# Patient Record
Sex: FEMALE | Race: WHITE | Employment: OTHER | URBAN - METROPOLITAN AREA
[De-identification: names, ages, dates, MRNs, and addresses within clinical notes are randomized per-mention and may not be internally consistent; named-entity substitution may affect disease eponyms.]

---

## 2019-05-17 ENCOUNTER — HOSPITAL ENCOUNTER (INPATIENT)
Age: 44
LOS: 2 days | Discharge: HOME | DRG: 640 | End: 2019-05-20
Attending: EMERGENCY MEDICINE | Admitting: INTERNAL MEDICINE
Payer: MEDICARE

## 2019-05-17 ENCOUNTER — APPOINTMENT (OUTPATIENT)
Dept: ULTRASOUND IMAGING | Age: 44
DRG: 640 | End: 2019-05-17
Payer: MEDICARE

## 2019-05-17 DIAGNOSIS — N30.00 ACUTE CYSTITIS WITHOUT HEMATURIA: Primary | ICD-10-CM

## 2019-05-17 DIAGNOSIS — E87.1 HYPONATREMIA: ICD-10-CM

## 2019-05-17 DIAGNOSIS — E87.6 HYPOKALEMIA: ICD-10-CM

## 2019-05-17 DIAGNOSIS — N18.9 CHRONIC RENAL IMPAIRMENT, UNSPECIFIED CKD STAGE: ICD-10-CM

## 2019-05-17 DIAGNOSIS — Q61.3 POLYCYSTIC KIDNEY DISEASE: ICD-10-CM

## 2019-05-17 LAB
A/G RATIO: 1.2 (ref 1.1–2.2)
ALBUMIN SERPL-MCNC: 4.6 G/DL (ref 3.4–5)
ALP BLD-CCNC: 80 U/L (ref 40–129)
ALT SERPL-CCNC: 7 U/L (ref 10–40)
AMYLASE: 133 U/L (ref 25–115)
ANION GAP SERPL CALCULATED.3IONS-SCNC: 21 MMOL/L (ref 3–16)
AST SERPL-CCNC: 12 U/L (ref 15–37)
BACTERIA: ABNORMAL /HPF
BASOPHILS ABSOLUTE: 0.1 K/UL (ref 0–0.2)
BASOPHILS RELATIVE PERCENT: 0.5 %
BILIRUB SERPL-MCNC: 0.4 MG/DL (ref 0–1)
BILIRUBIN URINE: NEGATIVE
BLOOD, URINE: ABNORMAL
BUN BLDV-MCNC: 84 MG/DL (ref 7–20)
CALCIUM SERPL-MCNC: 9.7 MG/DL (ref 8.3–10.6)
CHLORIDE BLD-SCNC: 90 MMOL/L (ref 99–110)
CLARITY: ABNORMAL
CO2: 17 MMOL/L (ref 21–32)
COLOR: YELLOW
CREAT SERPL-MCNC: 9.5 MG/DL (ref 0.6–1.1)
EOSINOPHILS ABSOLUTE: 0.1 K/UL (ref 0–0.6)
EOSINOPHILS RELATIVE PERCENT: 0.6 %
EPITHELIAL CELLS, UA: 5 /HPF (ref 0–5)
GFR AFRICAN AMERICAN: 5
GFR NON-AFRICAN AMERICAN: 4
GLOBULIN: 3.7 G/DL
GLUCOSE BLD-MCNC: 96 MG/DL (ref 70–99)
GLUCOSE URINE: NEGATIVE MG/DL
HCG QUALITATIVE: NEGATIVE
HCT VFR BLD CALC: 35.9 % (ref 36–48)
HEMOGLOBIN: 12 G/DL (ref 12–16)
HYALINE CASTS: 3 /LPF (ref 0–8)
KETONES, URINE: NEGATIVE MG/DL
LEUKOCYTE ESTERASE, URINE: ABNORMAL
LIPASE: 78 U/L (ref 13–60)
LYMPHOCYTES ABSOLUTE: 1.5 K/UL (ref 1–5.1)
LYMPHOCYTES RELATIVE PERCENT: 11.9 %
MAGNESIUM: 2.4 MG/DL (ref 1.8–2.4)
MCH RBC QN AUTO: 29.3 PG (ref 26–34)
MCHC RBC AUTO-ENTMCNC: 33.5 G/DL (ref 31–36)
MCV RBC AUTO: 87.4 FL (ref 80–100)
MICROSCOPIC EXAMINATION: YES
MONOCYTES ABSOLUTE: 0.8 K/UL (ref 0–1.3)
MONOCYTES RELATIVE PERCENT: 6.7 %
NEUTROPHILS ABSOLUTE: 9.9 K/UL (ref 1.7–7.7)
NEUTROPHILS RELATIVE PERCENT: 80.3 %
NITRITE, URINE: POSITIVE
PDW BLD-RTO: 12.6 % (ref 12.4–15.4)
PH UA: 6.5 (ref 5–8)
PLATELET # BLD: 274 K/UL (ref 135–450)
PMV BLD AUTO: 11.1 FL (ref 5–10.5)
POTASSIUM SERPL-SCNC: 2.6 MMOL/L (ref 3.5–5.1)
PROTEIN UA: 100 MG/DL
RBC # BLD: 4.11 M/UL (ref 4–5.2)
RBC UA: 7 /HPF (ref 0–4)
SODIUM BLD-SCNC: 128 MMOL/L (ref 136–145)
SPECIFIC GRAVITY UA: 1.01 (ref 1–1.03)
TOTAL PROTEIN: 8.3 G/DL (ref 6.4–8.2)
URINE REFLEX TO CULTURE: YES
URINE TYPE: ABNORMAL
UROBILINOGEN, URINE: 0.2 E.U./DL
WBC # BLD: 12.3 K/UL (ref 4–11)
WBC UA: 186 /HPF (ref 0–5)

## 2019-05-17 PROCEDURE — 82150 ASSAY OF AMYLASE: CPT

## 2019-05-17 PROCEDURE — 93005 ELECTROCARDIOGRAM TRACING: CPT | Performed by: PHYSICIAN ASSISTANT

## 2019-05-17 PROCEDURE — 99285 EMERGENCY DEPT VISIT HI MDM: CPT

## 2019-05-17 PROCEDURE — 76770 US EXAM ABDO BACK WALL COMP: CPT

## 2019-05-17 PROCEDURE — 81001 URINALYSIS AUTO W/SCOPE: CPT

## 2019-05-17 PROCEDURE — 80053 COMPREHEN METABOLIC PANEL: CPT

## 2019-05-17 PROCEDURE — 85025 COMPLETE CBC W/AUTO DIFF WBC: CPT

## 2019-05-17 PROCEDURE — 6360000002 HC RX W HCPCS: Performed by: PHYSICIAN ASSISTANT

## 2019-05-17 PROCEDURE — 87086 URINE CULTURE/COLONY COUNT: CPT

## 2019-05-17 PROCEDURE — 96375 TX/PRO/DX INJ NEW DRUG ADDON: CPT

## 2019-05-17 PROCEDURE — 96361 HYDRATE IV INFUSION ADD-ON: CPT

## 2019-05-17 PROCEDURE — 84703 CHORIONIC GONADOTROPIN ASSAY: CPT

## 2019-05-17 PROCEDURE — 83735 ASSAY OF MAGNESIUM: CPT

## 2019-05-17 PROCEDURE — 2580000003 HC RX 258: Performed by: PHYSICIAN ASSISTANT

## 2019-05-17 PROCEDURE — 83690 ASSAY OF LIPASE: CPT

## 2019-05-17 PROCEDURE — 96365 THER/PROPH/DIAG IV INF INIT: CPT

## 2019-05-17 RX ORDER — 0.9 % SODIUM CHLORIDE 0.9 %
1000 INTRAVENOUS SOLUTION INTRAVENOUS ONCE
Status: COMPLETED | OUTPATIENT
Start: 2019-05-17 | End: 2019-05-18

## 2019-05-17 RX ORDER — POTASSIUM CHLORIDE 7.45 MG/ML
10 INJECTION INTRAVENOUS ONCE
Status: COMPLETED | OUTPATIENT
Start: 2019-05-17 | End: 2019-05-17

## 2019-05-17 RX ORDER — ONDANSETRON 2 MG/ML
4 INJECTION INTRAMUSCULAR; INTRAVENOUS ONCE
Status: COMPLETED | OUTPATIENT
Start: 2019-05-17 | End: 2019-05-17

## 2019-05-17 RX ADMIN — SODIUM CHLORIDE 1000 ML: 9 INJECTION, SOLUTION INTRAVENOUS at 21:39

## 2019-05-17 RX ADMIN — Medication 10 MEQ: at 22:35

## 2019-05-17 RX ADMIN — ONDANSETRON 4 MG: 2 INJECTION INTRAMUSCULAR; INTRAVENOUS at 21:39

## 2019-05-17 ASSESSMENT — ENCOUNTER SYMPTOMS
ALLERGIC/IMMUNOLOGIC NEGATIVE: 1
ANAL BLEEDING: 0
BLOOD IN STOOL: 0
COUGH: 0
RECTAL PAIN: 0
WHEEZING: 0
VOMITING: 1
CONSTIPATION: 0
ABDOMINAL DISTENTION: 0
SHORTNESS OF BREATH: 0
NAUSEA: 1
STRIDOR: 0
ABDOMINAL PAIN: 0
DIARRHEA: 0
BACK PAIN: 0
COLOR CHANGE: 0

## 2019-05-17 NOTE — LETTER
Beneficiary Notification Letter     This [facility] Provider is Participating in an Innovative Payment and [address] from Johann Troncoso:   [facility] is participating in a Medicare initiative called the [facility] [address] [facility]. You are receiving this letter because your health care provider has identified you as a patient who is receiving care through this initiative. Health care providers participating in the [facility] [address] [facility], including [facility], will work with Medicare to improve care for patients. Your Medicare rights have not been changed. You still have all the same Medicare rights and protections, including the right to choose which hospital, doctor, or other health care provider you see. However, because [facility] chose to participate in the [address] [place] [address], all Medicare beneficiaries who meet the eligibility criteria of this initiative will receive care under the initiative. If you do not wish to receive care under the Bundled Payments [facility] [address] [facility], you must choose a health care provider that does not participate in this initiative for your care. Regardless of which health care provider you see, Medicare will continue to cover all of your medically necessary services. Bundled Payments for Care Improvement Advanced aims to help improve your care     The Bundled Payments [facility] [address] [facility] is an innovative Medicare initiative that encourages your doctors, hospitals, and other health care providers to work more closely together so you get better care during and following certain hospital stays.  In this initiative, doctors and hospitals may work closely with certain health care providers and suppliers that help patients recover after discharge from the hospital, including skilled nursing facilities, home health agencies, inpatient rehabilitation facilities, and long term care hospitals. [facility] is working closely with the doctors and other health care providers that care for you during and following your hospital stay and for a period of time after you leave the hospital. By working together, the health care providers are trying to more efficiently provide well-managed, high quality, patient-centered care as you undergo treatment. Hospitals, doctors, and other health care providers that care for you following a hospital stay may receive an additional payment for providing better, more coordinated health care. Medicare will monitor your care to make sure you and others get high quality care. Your feedback is important     Medicare may also ask you to answer a survey about the services and care you received from [address] [place] [address] will be mailed to you. Your feedback will improve care for all people with Medicare who receive care from [facility]. Completion of this survey is optional.     Get more information     For more information about the Bundled Payments for [facility], you can:    · Visit the CMS BPCI Advanced Website at http://sahu-allred.net/ initiatives/bpci-advanced   · Call the [facility] team at (737) 572-8168. · Call 1-800-MEDICARE (6-857.277.9675). TTY users can call 3-590.393.1035     If you have concerns or complaints about your care, talk to your health care provider, or contact your Beneficiary and Family Centered Quality Improvement Organization [facility]). To get your [facility]'s phone number, visit Medicare.gov/contacts or call 1-800-MEDICARE. · To find a different hospital, visit www. hospitalcompare.[facility].gov or call 1-800Infor MEDICARE (1-702.108.1040). TTY users should call 0-361.852.1349. · To find a different doctor, visit Medicare's Physician Compare website, HDTapes.co.nz, or call 1-800-MEDICARE (609 3306). TTY users should call 5-400.198.9579. · To find a different skilled nursing facility, visit [facility] Medico website, https://www.AuthorBee.Beddit/, or call 1-800-MEDICARE (1- 112.122.5414). TTY users should call 3-887.394.7732. · To find a different long term care hospital, visit [facility] Compare website, [employer].be, or call 1-800- MEDICARE (429 7569). TTY users should call 1-111.821.8558. · To find a different inpatient rehabilitation facility, visit [facility] Compare website, www.medicare.gov/ inpatientrehabilitation facilitycompare, or call 1-800-MEDICARE (9-167.316.5860). TTY users should call 3- 200.532.5577. · To find a different home health agency, visit [address] website, www.medicare.gov/homehealthcompare, or call 1-800-MEDICARE (2-781- 436-1440). TTY users should call [phone].

## 2019-05-18 PROBLEM — N39.0 UTI (URINARY TRACT INFECTION): Status: ACTIVE | Noted: 2019-05-18

## 2019-05-18 PROBLEM — I10 ESSENTIAL HYPERTENSION: Status: ACTIVE | Noted: 2019-05-18

## 2019-05-18 PROBLEM — N17.9 ACUTE RENAL FAILURE (ARF) (HCC): Status: ACTIVE | Noted: 2019-05-18

## 2019-05-18 PROBLEM — E87.6 HYPOKALEMIA: Status: ACTIVE | Noted: 2019-05-18

## 2019-05-18 LAB
ANION GAP SERPL CALCULATED.3IONS-SCNC: 20 MMOL/L (ref 3–16)
BUN BLDV-MCNC: 80 MG/DL (ref 7–20)
CALCIUM SERPL-MCNC: 9.1 MG/DL (ref 8.3–10.6)
CHLORIDE BLD-SCNC: 94 MMOL/L (ref 99–110)
CO2: 24 MMOL/L (ref 21–32)
CREAT SERPL-MCNC: 9.6 MG/DL (ref 0.6–1.1)
EKG ATRIAL RATE: 60 BPM
EKG DIAGNOSIS: NORMAL
EKG P AXIS: 39 DEGREES
EKG P-R INTERVAL: 170 MS
EKG Q-T INTERVAL: 454 MS
EKG QRS DURATION: 100 MS
EKG QTC CALCULATION (BAZETT): 454 MS
EKG R AXIS: 1 DEGREES
EKG T AXIS: 15 DEGREES
EKG VENTRICULAR RATE: 60 BPM
GFR AFRICAN AMERICAN: 5
GFR NON-AFRICAN AMERICAN: 4
GLUCOSE BLD-MCNC: 102 MG/DL (ref 70–99)
MAGNESIUM: 2.6 MG/DL (ref 1.8–2.4)
POTASSIUM SERPL-SCNC: 3 MMOL/L (ref 3.5–5.1)
SODIUM BLD-SCNC: 138 MMOL/L (ref 136–145)

## 2019-05-18 PROCEDURE — 6360000002 HC RX W HCPCS: Performed by: NURSE PRACTITIONER

## 2019-05-18 PROCEDURE — 2580000003 HC RX 258: Performed by: PHYSICIAN ASSISTANT

## 2019-05-18 PROCEDURE — 2500000003 HC RX 250 WO HCPCS: Performed by: PHYSICIAN ASSISTANT

## 2019-05-18 PROCEDURE — 6370000000 HC RX 637 (ALT 250 FOR IP): Performed by: INTERNAL MEDICINE

## 2019-05-18 PROCEDURE — 96375 TX/PRO/DX INJ NEW DRUG ADDON: CPT

## 2019-05-18 PROCEDURE — 2500000003 HC RX 250 WO HCPCS: Performed by: NURSE PRACTITIONER

## 2019-05-18 PROCEDURE — 93010 ELECTROCARDIOGRAM REPORT: CPT | Performed by: INTERNAL MEDICINE

## 2019-05-18 PROCEDURE — 84300 ASSAY OF URINE SODIUM: CPT

## 2019-05-18 PROCEDURE — 6360000002 HC RX W HCPCS: Performed by: PHYSICIAN ASSISTANT

## 2019-05-18 PROCEDURE — 1200000000 HC SEMI PRIVATE

## 2019-05-18 PROCEDURE — 96361 HYDRATE IV INFUSION ADD-ON: CPT

## 2019-05-18 PROCEDURE — 2580000003 HC RX 258: Performed by: NURSE PRACTITIONER

## 2019-05-18 PROCEDURE — 6370000000 HC RX 637 (ALT 250 FOR IP): Performed by: HOSPITALIST

## 2019-05-18 PROCEDURE — 82436 ASSAY OF URINE CHLORIDE: CPT

## 2019-05-18 PROCEDURE — 83735 ASSAY OF MAGNESIUM: CPT

## 2019-05-18 PROCEDURE — 36415 COLL VENOUS BLD VENIPUNCTURE: CPT

## 2019-05-18 PROCEDURE — 80048 BASIC METABOLIC PNL TOTAL CA: CPT

## 2019-05-18 PROCEDURE — 84133 ASSAY OF URINE POTASSIUM: CPT

## 2019-05-18 RX ORDER — METOPROLOL TARTRATE 50 MG/1
50 TABLET, FILM COATED ORAL 2 TIMES DAILY
Status: DISCONTINUED | OUTPATIENT
Start: 2019-05-18 | End: 2019-05-20 | Stop reason: HOSPADM

## 2019-05-18 RX ORDER — ASPIRIN 81 MG/1
81 TABLET, CHEWABLE ORAL DAILY
Status: DISCONTINUED | OUTPATIENT
Start: 2019-05-18 | End: 2019-05-20 | Stop reason: HOSPADM

## 2019-05-18 RX ORDER — SENNA PLUS 8.6 MG/1
1 TABLET ORAL DAILY PRN
Status: DISCONTINUED | OUTPATIENT
Start: 2019-05-18 | End: 2019-05-20 | Stop reason: HOSPADM

## 2019-05-18 RX ORDER — SODIUM CHLORIDE 0.9 % (FLUSH) 0.9 %
10 SYRINGE (ML) INJECTION PRN
Status: DISCONTINUED | OUTPATIENT
Start: 2019-05-18 | End: 2019-05-20 | Stop reason: HOSPADM

## 2019-05-18 RX ORDER — HEPARIN SODIUM 5000 [USP'U]/ML
5000 INJECTION, SOLUTION INTRAVENOUS; SUBCUTANEOUS 2 TIMES DAILY
Status: DISCONTINUED | OUTPATIENT
Start: 2019-05-18 | End: 2019-05-20 | Stop reason: HOSPADM

## 2019-05-18 RX ORDER — POTASSIUM CHLORIDE 20 MEQ/1
40 TABLET, EXTENDED RELEASE ORAL PRN
Status: DISCONTINUED | OUTPATIENT
Start: 2019-05-18 | End: 2019-05-18

## 2019-05-18 RX ORDER — M-VIT,TX,IRON,MINS/CALC/FOLIC 27MG-0.4MG
1 TABLET ORAL
Status: DISCONTINUED | OUTPATIENT
Start: 2019-05-19 | End: 2019-05-20 | Stop reason: HOSPADM

## 2019-05-18 RX ORDER — DEXTROAMPHETAMINE SACCHARATE, AMPHETAMINE ASPARTATE, DEXTROAMPHETAMINE SULFATE AND AMPHETAMINE SULFATE 2.5; 2.5; 2.5; 2.5 MG/1; MG/1; MG/1; MG/1
20 TABLET ORAL DAILY
Status: DISCONTINUED | OUTPATIENT
Start: 2019-05-18 | End: 2019-05-20 | Stop reason: HOSPADM

## 2019-05-18 RX ORDER — ONDANSETRON 2 MG/ML
4 INJECTION INTRAMUSCULAR; INTRAVENOUS EVERY 6 HOURS PRN
Status: DISCONTINUED | OUTPATIENT
Start: 2019-05-18 | End: 2019-05-20 | Stop reason: HOSPADM

## 2019-05-18 RX ORDER — PANTOPRAZOLE SODIUM 40 MG/1
40 TABLET, DELAYED RELEASE ORAL
Status: DISCONTINUED | OUTPATIENT
Start: 2019-05-19 | End: 2019-05-20 | Stop reason: HOSPADM

## 2019-05-18 RX ORDER — ACETAMINOPHEN 325 MG/1
650 TABLET ORAL EVERY 4 HOURS PRN
Status: DISCONTINUED | OUTPATIENT
Start: 2019-05-18 | End: 2019-05-20 | Stop reason: HOSPADM

## 2019-05-18 RX ORDER — POTASSIUM CHLORIDE 7.45 MG/ML
10 INJECTION INTRAVENOUS PRN
Status: DISCONTINUED | OUTPATIENT
Start: 2019-05-18 | End: 2019-05-18

## 2019-05-18 RX ORDER — SODIUM CHLORIDE 0.9 % (FLUSH) 0.9 %
10 SYRINGE (ML) INJECTION EVERY 12 HOURS SCHEDULED
Status: DISCONTINUED | OUTPATIENT
Start: 2019-05-18 | End: 2019-05-20 | Stop reason: HOSPADM

## 2019-05-18 RX ORDER — ONDANSETRON 4 MG/1
4 TABLET, ORALLY DISINTEGRATING ORAL EVERY 8 HOURS PRN
Status: DISCONTINUED | OUTPATIENT
Start: 2019-05-18 | End: 2019-05-20 | Stop reason: HOSPADM

## 2019-05-18 RX ORDER — POTASSIUM CHLORIDE 20 MEQ/1
20 TABLET, EXTENDED RELEASE ORAL 2 TIMES DAILY WITH MEALS
Status: DISCONTINUED | OUTPATIENT
Start: 2019-05-18 | End: 2019-05-19

## 2019-05-18 RX ADMIN — METOPROLOL TARTRATE 50 MG: 50 TABLET, FILM COATED ORAL at 20:35

## 2019-05-18 RX ADMIN — Medication 1 G: at 01:11

## 2019-05-18 RX ADMIN — POTASSIUM CHLORIDE: 2 INJECTION, SOLUTION, CONCENTRATE INTRAVENOUS at 21:00

## 2019-05-18 RX ADMIN — POTASSIUM CHLORIDE: 2 INJECTION, SOLUTION, CONCENTRATE INTRAVENOUS at 01:11

## 2019-05-18 RX ADMIN — HEPARIN SODIUM 5000 UNITS: 5000 INJECTION INTRAVENOUS; SUBCUTANEOUS at 10:19

## 2019-05-18 RX ADMIN — ASPIRIN 81 MG 81 MG: 81 TABLET ORAL at 12:00

## 2019-05-18 RX ADMIN — HEPARIN SODIUM 5000 UNITS: 5000 INJECTION INTRAVENOUS; SUBCUTANEOUS at 20:35

## 2019-05-18 RX ADMIN — Medication 10 ML: at 10:19

## 2019-05-18 RX ADMIN — POTASSIUM CHLORIDE 20 MEQ: 1500 TABLET, EXTENDED RELEASE ORAL at 16:55

## 2019-05-18 RX ADMIN — DEXTROAMPHETAMINE SACCHARATE, AMPHETAMINE ASPARTATE, DEXTROAMPHETAMINE SULFATE AND AMPHETAMINE SULFATE 20 MG: 2.5; 2.5; 2.5; 2.5 TABLET ORAL at 12:00

## 2019-05-18 NOTE — ED PROVIDER NOTES
[address]        Pt Name: Rad Martínez  MRN: [id number]  [place] 1975  Date of evaluation: 5/17/2019  Provider: Darryle Heaps, PA-C  PCP: Clemente Barnes    This patient was seen and evaluated by the attending physician Dr. Mari Farooq       Chief Complaint   Patient presents with    Emesis     emesis x3 days, denies diarrhea or abd pain        HISTORY OF PRESENT ILLNESS   (Location/Symptom, Timing/Onset, Context/Setting, Quality, Duration, Modifying Factors, Severity)  Note limiting factors. Rad Martínez is a 37 y.o. female with history of CVA, brain aneurysm, polycystic kidney disease who presents to the emergency department with complaints of nausea and vomiting for 3 days. She denies any acute urinary symptoms, hematuria, abnormal vaginal discharge, abdominal pain or distention, headache, dizziness, lightheadedness, diarrhea, constipation, blood or black tarry stool. She is resting comfortably in bed and does not appear to be writhing in pain, pale, diaphoretic or in acute distress. She has history of kidney disease  But is not on dialysis. Nursing Notes were all reviewed and agreed with or any disagreements were addressed  in the HPI. REVIEW OF SYSTEMS    (2-9 systems for level 4, 10 or more for level 5)     Review of Systems   Constitutional: Negative for chills and fever. HENT: Negative. Eyes: Negative for visual disturbance. Respiratory: Negative for cough, shortness of breath, wheezing and stridor. Cardiovascular: Negative for chest pain, palpitations and leg swelling. Gastrointestinal: Positive for nausea and vomiting. Negative for abdominal distention, abdominal pain, anal bleeding, blood in stool, constipation, diarrhea and rectal pain. Endocrine: Negative. Genitourinary: Negative. Musculoskeletal: Negative for back pain, neck pain and neck stiffness.    Skin: Negative for color change, pallor, rash and wound. Allergic/Immunologic: Negative. Neurological: Negative for dizziness, tremors, seizures, syncope, facial asymmetry, speech difficulty, weakness, light-headedness, numbness and headaches. Hematological: Negative. Psychiatric/Behavioral: Negative for confusion. All other systems reviewed and are negative. Positives and Pertinent negatives as per HPI. Except as noted abovein the ROS, all other systems were reviewed and negative. PAST MEDICAL HISTORY     Past Medical History:   Diagnosis Date    Brain aneurysm     Polycystic kidney disease     Unspecified cerebral artery occlusion with cerebral infarction          SURGICAL HISTORY     Past Surgical History:   Procedure Laterality Date    EYE SURGERY           CURRENTMEDICATIONS       Previous Medications    AMPHETAMINE-DEXTROAMPHETAMINE (ADDERALL, 20MG,) 20 MG TABLET    Take 20 mg by mouth daily. ASPIRIN 325 MG TABLET    Take 325 mg by mouth daily. METOPROLOL (LOPRESSOR) 100 MG TABLET    Take 75 mg by mouth 2 times daily. MULTIPLE VITAMINS-MINERALS (THERAPEUTIC MULTIVITAMIN-MINERALS) TABLET    Take 1 tablet by mouth daily.     OMEPRAZOLE (PRILOSEC) 40 MG CAPSULE    Take 1 capsule by mouth daily    ONDANSETRON (ZOFRAN ODT) 4 MG DISINTEGRATING TABLET    Take 1 tablet by mouth every 8 hours as needed for Nausea         ALLERGIES     Contrast [iodides]    FAMILYHISTORY       Family History   Problem Relation Age of Onset    Kidney Disease Mother         PKD    Kidney Disease Sister         PKD          SOCIAL HISTORY       Social History     Socioeconomic History    Marital status: Single     Spouse name: None    Number of children: None    Years of education: None    Highest education level: None   Occupational History    None   Social Needs    Financial resource strain: None    Food insecurity:     Worry: None     Inability: None    Transportation needs:     Medical: None Non-medical: None   Tobacco Use    Smoking status: Current Some Day Smoker     Packs/day: 0.05     Types: Cigarettes    Smokeless tobacco: Never Used    Tobacco comment: 1-2 cigarettes per day   Substance and Sexual Activity    Alcohol use: No     Comment: rarely    Drug use: Yes     Types: Marijuana     Comment: occ    Sexual activity: Not Currently   Lifestyle    Physical activity:     Days per week: None     Minutes per session: None    Stress: None   Relationships    Social connections:     Talks on phone: None     Gets together: None     Attends Yarsanism service: None     Active member of club or organization: None     Attends meetings of clubs or organizations: None     Relationship status: None    Intimate partner violence:     Fear of current or ex partner: None     Emotionally abused: None     Physically abused: None     Forced sexual activity: None   Other Topics Concern    None   Social History Narrative    None       SCREENINGS             PHYSICAL EXAM    (up to 7 for level 4, 8 or more for level 5)     ED Triage Vitals [05/17/19 2107]   BP Temp Temp src Pulse Resp SpO2 Height Weight   (!) 106/51 97.9 °F (36.6 °C) -- 73 16 100 % 5' 4\" (1.626 m) 127 lb 6.4 oz (57.8 kg)       Physical Exam   Constitutional: She is oriented to person, place, and time. She appears well-developed and well-nourished. No distress. HENT:   Head: Normocephalic and atraumatic. Right Ear: External ear normal.   Left Ear: External ear normal.   Nose: Nose normal.   Mouth/Throat: Oropharynx is clear and moist.   Eyes: Pupils are equal, round, and reactive to light. Conjunctivae and EOM are normal. Right eye exhibits no discharge. Left eye exhibits no discharge. No scleral icterus. Neck: Normal range of motion. No JVD present. Cardiovascular: Normal rate. Pulmonary/Chest: Effort normal and breath sounds normal.   Abdominal: Soft.  Bowel sounds are normal. She exhibits no abdominal bruit and no pulsatile midline mass. There is no tenderness. There is no rigidity, no rebound, no guarding, no CVA tenderness, no tenderness at McBurney's point and negative Bernard's sign. Musculoskeletal: Normal range of motion. Lymphadenopathy:     She has no cervical adenopathy. Neurological: She is alert and oriented to person, place, and time. No cranial nerve deficit (II-XII intact). Skin: Skin is warm and dry. Capillary refill takes less than 2 seconds. No rash noted. She is not diaphoretic. No erythema. No pallor. Psychiatric: She has a normal mood and affect. Her behavior is normal.   Nursing note and vitals reviewed.       DIAGNOSTIC RESULTS   LABS:    Labs Reviewed   CBC WITH AUTO DIFFERENTIAL - Abnormal; Notable for the following components:       Result Value    WBC 12.3 (*)     Hematocrit 35.9 (*)     MPV 11.1 (*)     Neutrophils # 9.9 (*)     All other components within normal limits    Narrative:     Performed at:  [facility] [address], [place], [address] [place]   Phone [phone]   COMPREHENSIVE METABOLIC PANEL - Abnormal; Notable for the following components:    Sodium 128 (*)     Potassium 2.6 (*)     Chloride 90 (*)     CO2 17 (*)     Anion Gap 21 (*)     BUN 84 (*)     CREATININE 9.5 (*)     GFR Non- 4 (*)     GFR  5 (*)     Total Protein 8.3 (*)     ALT 7 (*)     AST 12 (*)     All other components within normal limits    Narrative:     LIZ Donnelly  [facility] tel. [phone],  Chemistry results called to and read back by ARACELY Heath, 05/17/2019  21:49, by Ethel Simmons  Performed at:  [facility] [address], [place], [facility] [place]   Phone [phone]   LIPASE - Abnormal; Notable for the following components:    Lipase 78.0 (*)     All other components within normal limits    Narrative:     Dorla Jeans  [facility] tel. [phone],  Chemistry results called to and read back by ARACELY Heath, 05/17/2019  21:49, by Leona Jameson  Performed at:  [facility] [address] [address] [place] [address] [place]   Phone [phone]   AMYLASE - Abnormal; Notable for the following components:    Amylase 133 (*)     All other components within normal limits    Narrative:     Dominga Oro. [phone],  Chemistry results called to and read back by ARACELY Reno, 05/17/2019  21:49, by Leona Jameson  Performed at:  [facility] [address] [place],  [place], [facility] [place]   Phone [phone]   URINE RT REFLEX TO CULTURE - Abnormal; Notable for the following components:    Clarity, UA TURBID (*)     Blood, Urine LARGE (*)     Protein,  (*)     Nitrite, Urine POSITIVE (*)     Leukocyte Esterase, Urine LARGE (*)     All other components within normal limits    Narrative:     Performed at:  [facility] [address] [place],  [place], [address] [place]   Phone [phone]   MICROSCOPIC URINALYSIS - Abnormal; Notable for the following components:    Bacteria, UA 4+ (*)     WBC,  (*)     RBC, UA 7 (*)     All other components within normal limits    Narrative:     Performed at:  [facility] [address] [address] [place] [address] [place]   Phone [phone]   URINE CULTURE   HCG, SERUM, QUALITATIVE    Narrative:     Performed at:  [facility] [address], [place], [address] [employer] [place]   Phone [phone]   MAGNESIUM    Narrative:     Performed at:  [facility] [address] [facility] [address]  [place] [address] [place] [place]   Phone [phone]       All other labs were within normal range or not returned as of this dictation. EKG: All EKG's are interpreted by the Emergency Department Physician who either signs orCo-signs this chart in the absence of a cardiologist.  Please see their note for interpretation of EKG.       RADIOLOGY:   Non-plain film images such as CT, Ultrasound and MRI are read by the radiologistShu Rodriguez radiographic images are visualized andpreliminarily interpreted by the  ED Provider with the below findings:        Interpretation Formerly named [facility] Radiologist below, if available at the time of this note:    US RENAL COMPLETE   Final Result   Enlarged kidneys containing innumerable cysts of varying sizes compatible   with polycystic kidney disease. PROCEDURES   Unless otherwise noted below, none     Procedures    CRITICAL CARE TIME   Critical Care  There was a high probability of life-threatening clinical deterioration in the patient's condition requiring my urgent intervention. Total critical care time with the patient was 33 minutes excluding separately reportable procedures. Critical care required due to patients renal insufficiency with metabolic disturbance prompting consultation, medical management, admission. CONSULTS:  Eva Fine HOSPITALIST  Dr. Kayli Crowley consulted at [facility]. Recommends bicarb/D50/KCL infusion with specific instructions he has provided. I spoke with Maximino Elise, hospitalist, who will admit.  [address] [place] [address] and DIFFERENTIALDIAGNOSIS/MDM:   Vitals:    Vitals:    05/17/19 2147 05/17/19 2200 05/17/19 2230 05/17/19 2314   BP:  103/82 114/73 120/72   Pulse:   67    Resp:   14    Temp:       SpO2: 100%      Weight:       Height:           Patient was given thefollowing medications:  Medications   cefTRIAXone (ROCEPHIN) 1 g in sterile water 10 mL IV syringe (has no administration in time range)   sodium bicarbonate 150 mEq, potassium chloride 40 mEq in dextrose 5 % 1,000 mL infusion (has no administration in time range)   ondansetron (ZOFRAN) injection 4 mg (4 mg Intravenous Given 5/17/19 2139)   0.9 % sodium chloride bolus (1,000 mLs Intravenous New Bag 5/17/19 2139)   potassium chloride 10 mEq/100 mL IVPB (Peripheral Line) (0 mEq Intravenous Stopped 5/17/19 2359)     This patient presents to the emergency department with nausea and vomiting. Urinalysis suggests infection, therefore antibiotics administered. She does have leukocytosis, chronic renal insufficiency with acute metabolic changes. Therefore, I did consult with nephrology who gave me strict instructions. Hospitalist will admit for further management. Patient understands and agrees with plan. Renal ultrasound is fairly stable/consistent with her history. We have addressed concerns and expectations. FINAL IMPRESSION      1. Acute cystitis without hematuria    2. Chronic renal impairment, unspecified CKD stage    3. Hypokalemia    4. Hyponatremia    5. Polycystic kidney disease          DISPOSITION/PLAN   DISPOSITION Decision To Admit 05/18/2019 12:09:02 AM      PATIENT REFERREDTO:  No follow-up provider specified.     DISCHARGE MEDICATIONS:  New Prescriptions    No medications on file       DISCONTINUED MEDICATIONS:  Discontinued Medications    No medications on file              (Please note that portions ofthis note were completed with a voice recognition program.  Efforts were made to edit the dictations but occasionally words are mis-transcribed.)    Mirtha Padilla PA-C (electronically signed)           Mirtha Padilla PA-C  05/18/19 7451

## 2019-05-18 NOTE — PLAN OF CARE
Problem: Falls - Risk of:  Goal: Will remain free from falls  Description  Will remain free from falls. Pt understands need to use call light for assistance.    Outcome: Ongoing

## 2019-05-18 NOTE — ED PROVIDER NOTES
I independently performed a history and physical on Ana. This is a very pleasant 37 y.o. female  who was evaluated in the emergency department for vomiting as been ongoing for the past 2 days. Patient has a history of polycystic kidney disease. Focused exam:  The physical exam reveals an alert and oriented patient who does not appear to be confused, non-ill-appearing, no abnormal heart or lung sounds, benign abdominal exam, no CVA tenderness    Brief ED course/MDM:     Procedures/interventions/images ordered for this visit  Orders Placed This Encounter   Procedures    Urine Culture    US RENAL COMPLETE    CBC auto differential    Comprehensive metabolic panel    Lipase    Amylase    Urinalysis Reflex to Culture    HCG Qualitative, Serum    Magnesium    Microscopic Urinalysis    Diet NPO Effective Now    Inpatient consult to Nephrology    Inpatient consult to Hospitalist    EKG 12 Lead    Insert peripheral IV    PATIENT STATUS (FROM ED OR OR/PROCEDURAL) Inpatient       Medications ordered for this visit  Orders Placed This Encounter   Medications    ondansetron (ZOFRAN) injection 4 mg    0.9 % sodium chloride bolus    potassium chloride 10 mEq/100 mL IVPB (Peripheral Line)    cefTRIAXone (ROCEPHIN) 1 g in sterile water 10 mL IV syringe    sodium bicarbonate 150 mEq, potassium chloride 40 mEq in dextrose 5 % 1,000 mL infusion        EKG  The Ekg interpreted by me shows  normal sinus rhythm with a rate of 60  Axis is   Normal  QTc is  normal  Intervals and Durations are unremarkable. ST Segments: normal  Delta waves, Brugada Syndrome, and Short AR are not present. Prior EKG to compare with was available. No significant changes compared to prior EKG from April 14, 2018      Final Impression    1. Acute cystitis without hematuria    2. Chronic renal impairment, unspecified CKD stage    3. Hypokalemia    4. Hyponatremia    5.  Polycystic kidney disease        Blood pressure 108/70, pulse 67, temperature 97.9 °F (36.6 °C), resp. rate 21, height 5' 4\" (1.626 m), weight 127 lb 6.4 oz (57.8 kg), last menstrual period 05/02/2019, SpO2 98 %. All diagnostic, treatment, and disposition decisions were made by myself in conjunction with the JOSELO/resident. For further details of the patient's emergency department visit, please see JOSELO/resident documentation. Initial history and physical exam information was obtained by the JOSELO/resident, also dictated a record of this visit. I independently examined and evaluated this patient and participated in the diagnostic, treatment and disposition decisions. The note was completed using Dragon voice recognition transcription. Every effort was made to ensure accuracy; however, inadvertent transcription errors may be present despite my best efforts to edit errors.     Adelfo Pulido MD  [address] [place] [address]        Adelfo Pulido MD  05/18/19 7599

## 2019-05-18 NOTE — H&P
Hospital Medicine History & Physical      PCP: Leonard AGUILA    Date of Admission: 5/17/2019    Date of Service: Pt seen/examined on May 18, 2019 and Admitted to Inpatient with expected LOS greater than two midnights due to medical therapy. Chief Complaint:  Nausea and vomiting      History Of Present Illness:      37 y.o. female with PMHx of brain aneurysms, polycystic kidney disease, stroke, depression, chronic kidney disease stage V,  presented to [facility] emergency room with complaints of 3 day history of emesis. She denies any abdominal pain denies any diarrhea or fevers. Patient has had 2 previous admissions at [facility] for the same symptoms. llast admission was dated April 8, 2019. She has been seen by nephrology and it was recommended that the patient start hemodialysis. The patient has declined any dialysis. She currently is homeless. She has no transportation. She has been told she needs a transplant however her insurance is not willing to pay for this transplant. So she is no longer going to seek this type of treatment. Patient had a psych evaluation during her last admission noted 4/8/2019. It was noted that she is competent to make her own decisions. She reports that she's had 2 strokes from brain aneurysms and has had 6 brain surgeries. Past Medical History:          Diagnosis Date    Brain aneurysm     Polycystic kidney disease     Unspecified cerebral artery occlusion with cerebral infarction        Past Surgical History:          Procedure Laterality Date    EYE SURGERY         Medications Prior to Admission:      Prior to Admission medications    Medication Sig Start Date End Date Taking? Authorizing Provider   amphetamine-dextroamphetamine (ADDERALL, 20MG,) 20 MG tablet Take 20 mg by mouth daily. Yes Historical Provider, MD   metoprolol (LOPRESSOR) 100 MG tablet Take 75 mg by mouth 2 times daily.    Yes Historical Provider, MD   ondansetron (ZOFRAN ODT) turgor normal.  No rashes or lesions. Neurologic:  Neurovascularly intact without any focal sensory/motor deficits. Cranial nerves: II-XII intact, grossly non-focal.  Psychiatric:  Alert and oriented, thought content appropriate, normal insight  Capillary Refill: Brisk,< 3 seconds   Peripheral Pulses: +2 palpable, equal bilaterally       Labs:     Recent Labs     05/17/19 2118   WBC 12.3*   HGB 12.0   HCT 35.9*        Recent Labs     05/17/19 2118   *   K 2.6*   CL 90*   CO2 17*   BUN 84*   CREATININE 9.5*   CALCIUM 9.7     Recent Labs     05/17/19 2118   AST 12*   ALT 7*   BILITOT 0.4   ALKPHOS 80     No results for input(s): INR in the last 72 hours. No results for input(s): Jes Harrington in the last 72 hours. Urinalysis:      Lab Results   Component Value Date    NITRU POSITIVE 05/17/2019    WBCUA 186 05/17/2019    BACTERIA 4+ 05/17/2019    RBCUA 7 05/17/2019    BLOODU LARGE 05/17/2019    SPECGRAV 1.009 05/17/2019    GLUCOSEU Negative 05/17/2019       Radiology:     CXR: I have reviewed the CXR with the following interpretation:   EKG:  I have reviewed the EKG with the following interpretation:     US RENAL COMPLETE   Final Result   Enlarged kidneys containing innumerable cysts of varying sizes compatible   with polycystic kidney disease.              ASSESSMENT:    Active Hospital Problems    Diagnosis Date Noted    UTI (urinary tract infection) [N39.0] 05/18/2019    Hypokalemia [E87.6] 05/18/2019    Acute renal failure (ARF) ([facility] [address] [N17.9] 05/18/2019    Essential hypertension [I10] 05/18/2019         PLAN:    UTI  Urinary tract infection  - Previous cultures result reviewed were negative import Logan Gent he is  - Urine and blood cultures pending  - Start abx therapy with Rocephin  - Hydrate with IVF as recommended by nephrology    Acute renal failure  - Renal function is at baseline and is stable; Monitor renal function and avoid Nephrotoxic agents as able   -IV fluids per nephrology's recommendation  -she refuses dialysis    Hypokalemia  Related to the nausea and vomiting and UTI  Hypokalemia - Will replace Potassium, check the Magnesium level. Labs have been ordered to recheck Potassium levels in the am.    Nausea and vomiting  Non-Intractable vomiting with nausea - Will provide symptomatic treatment with Zofran as needed, maintenance of fluids and electrolytes. Hypertension  -Meds on hold until nephrology evaluates patient  -Avoid nephrotoxic medications    DVT Prophylaxis: Lovenox  Diet: Diet NPO Effective Now  Code Status:full code    PT/OT Eval Status: n/a    Dispo - inpatient admission       [employer], APRN - CNP    Thank you Asha Tenorio for the opportunity to be involved in this patient's care. If you have any questions or concerns please feel free to contact me at [phone].

## 2019-05-18 NOTE — PROGRESS NOTES
[facility] HOSPITALISTS PROGRESS NOTE    5/18/2019 7:41 AM        Name: Jenifer Alvarez . Admitted: 5/17/2019  Primary Care Provider: Charlie Delacruz (Tel: [phone])                        Hospital course:  37 y.o. female with PMHx of brain aneurysms, polycystic kidney disease, stroke, depression, chronic kidney disease stage V,  presented to [facility] emergency room with complaints of 3 day history of emesis. She denies any abdominal pain denies any diarrhea or fevers. Subjective: She feels good this morning. I attempted to draw blood for stat potassium levels unable to draw after 2 attempts, we will try again this afternoon . No acute events overnight. Resting well. Pain control. Diet ok. Labs reviewed  Denies any chest pain sob. Reviewed interval ancillary notes    Current Medications    sodium bicarbonate 150 mEq, potassium chloride 40 mEq in dextrose 5 % 1,000 mL infusion Continuous   sodium chloride flush 0.9 % injection 10 mL 2 times per day   sodium chloride flush 0.9 % injection 10 mL PRN   ondansetron (ZOFRAN) injection 4 mg Q6H PRN   acetaminophen (TYLENOL) tablet 650 mg Q4H PRN   [START ON 5/19/2019] cefTRIAXone (ROCEPHIN) 1 g in sterile water 10 mL IV syringe Q24H   senna (SENOKOT) tablet 8.6 mg Daily PRN   heparin (porcine) injection 5,000 Units BID       Objective:  /77   Pulse 64   Temp 97.9 °F (36.6 °C) (Oral)   Resp 16   Ht 5' 4\" (1.626 m)   Wt 127 lb 6.4 oz (57.8 kg)   LMP 05/02/2019   SpO2 100%   BMI 21.87 kg/m²   No intake or output data in the 24 hours ending 05/18/19 0741 Wt Readings from Last 3 Encounters:   05/17/19 127 lb 6.4 oz (57.8 kg)   05/26/16 150 lb (68 kg)   12/09/14 140 lb (63.5 kg)       General appearance:  Appears comfortable  Eyes: Sclera clear. Pupils equal.  ENT: Moist oral mucosa.  Trachea midline, no adenopathy. Cardiovascular: Regular rhythm, normal S1, S2. No murmur. No edema in lower extremities  Respiratory: Not using accessory muscles. Good inspiratory effort. Clear to auscultation bilaterally, no wheeze or crackles. GI: Abdomen soft, no tenderness, not distended, normal bowel sounds  Musculoskeletal: No cyanosis in digits, neck supple  Neurology: CN 2-12 grossly intact. No speech or motor deficits  Psych: Normal affect. Alert and oriented in time, place and person  Skin: Warm, dry, normal turgor    Labs and Tests:  CBC:   Recent Labs     05/17/19 2118   WBC 12.3*   HGB 12.0        BMP:  Recent Labs     05/17/19 2118   *   K 2.6*   CL 90*   CO2 17*   BUN 84*   CREATININE 9.5*   GLUCOSE 96     Hepatic: Recent Labs     05/17/19 2118   AST 12*   ALT 7*   BILITOT 0.4   ALKPHOS 80         Problem List  Active Problems:    UTI (urinary tract infection)    Hypokalemia    Acute renal failure (ARF) (HCC)    Essential hypertension  Resolved Problems:    * No resolved hospital problems. *       Assessment & Plan:     1. Urinary tract infection: Urine and blood cultures pending , c/w  Rocephin  2. CHANTEL on CKD   at baseline   she refuses dialysis, nephrology consulted  3. Hypokalemia: D5 half-normal saline with 40 KCl running, we will attempt to check another potassium level this afternoon  4. Nausea and vomiting improved, continue  Zofran as needed, maintenance of fluids and electrolytes. We will resume liquid diet  5.  Hypertension:-Meds on hold until nephrology evaluates patient          Diet: Diet NPO Effective Now  Code:Full Code  DVT PPX lovenox       Leticia Vogel MD   5/18/2019 7:41 AM

## 2019-05-18 NOTE — CONSULTS
MD Renae Christianson MD Marlyse Bari, MD                                  Office: [phone]                 Fax: [phone]          [employer]                     NEPHROLOGY CONSULTATION NOTE:     PATIENT NAME: Derick Park  : 1975  MRN: [id number]      Name:  Derick Park Date/Time of Admission: 2019  9:15 PM    CSN: [id number] Attending Provider: Amalia Fallon MD   Room/Bed: [address] [place], [place] [id number] : 1975 Age: 37 y.o. Reason for Nephrology consult :  Evaluation of patient with worsening renal failure. History of Presenting complaint:       Derick Park 37 y. o. And has been admitted with multiple complaints of generalized weakness, nausea vomiting and cannot keep anything down. She denies any abdominal pain. Patient says she is dehydrated. She was found to have a potassium of 2.2 on admission and worsening metabolic acidosis. She is currently getting IV fluids. Brandon Arteaga has polycystic kidney disease. She has advanced end-stage kidney failure. We have seen her repeatedly during her multiple hospital admissions (usually goes to [facility]). Her regular nephrologist is Dr. Colleen Jaquez nephrology. Patient has serious underlying social issues. She does not keep any of her follow-up appointments. Despite repeatedly being told about the serious and advanced nature of her kidney failure, she refuses to start dialysis. During her previous admission in [place] months ago, patient had psych evaluation to determine her competence, and was found to be competent to make her own medical decisions. She denies any diarrhea. Difficult to make any meaningful conversation with Brandon Arteaga    No fever. Denies any back pain. Medications reviewed. Medical records reviewed.           Past Medical History :         Past Medical History:   Diagnosis Date    Brain aneurysm     Polycystic kidney disease     Unspecified cerebral artery occlusion with cerebral infarction        Medications  Which i have  Reviewed, also have reviewed home medications  Prior to Admission medications    Medication Sig Start Date End Date Taking? Authorizing Provider   amphetamine-dextroamphetamine (ADDERALL, 20MG,) 20 MG tablet Take 20 mg by mouth daily. Yes Historical Provider, MD   metoprolol tartrate (LOPRESSOR) 50 MG tablet Take 50 mg by mouth 2 times daily    Yes Historical Provider, MD   ondansetron (ZOFRAN ODT) 4 MG disintegrating tablet Take 1 tablet by mouth every 8 hours as needed for Nausea 5/26/16   Chano Fields MD   omeprazole (PRILOSEC) 40 MG capsule Take 1 capsule by mouth daily 5/26/16   Chano Fields MD   aspirin 81 MG tablet Take 81 mg by mouth daily     Historical Provider, MD   Multiple Vitamins-Minerals (THERAPEUTIC MULTIVITAMIN-MINERALS) tablet Take 1 tablet by mouth daily.     Historical Provider, MD     Current Facility-Administered Medications: sodium bicarbonate 150 mEq, potassium chloride 40 mEq in dextrose 5 % 1,000 mL infusion, , Intravenous, Continuous  sodium chloride flush 0.9 % injection 10 mL, 10 mL, Intravenous, 2 times per day  sodium chloride flush 0.9 % injection 10 mL, 10 mL, Intravenous, PRN  ondansetron (ZOFRAN) injection 4 mg, 4 mg, Intravenous, Q6H PRN  acetaminophen (TYLENOL) tablet 650 mg, 650 mg, Oral, Q4H PRN  [START ON 5/19/2019] cefTRIAXone (ROCEPHIN) 1 g in sterile water 10 mL IV syringe, 1 g, Intravenous, Q24H  senna (SENOKOT) tablet 8.6 mg, 1 tablet, Oral, Daily PRN  heparin (porcine) injection 5,000 Units, 5,000 Units, Subcutaneous, BID  amphetamine-dextroamphetamine (ADDERALL) tablet 20 mg, 20 mg, Oral, Daily  aspirin chewable tablet 81 mg, 81 mg, Oral, Daily  metoprolol tartrate (LOPRESSOR) tablet 50 mg, 50 mg, Oral, BID  [START ON 5/19/2019] therapeutic multivitamin-minerals 1 tablet, 1 tablet, Oral, Lunch  [START ON 5/19/2019] pantoprazole (PROTONIX) tablet 40 mg, 40 mg, Oral, QAM AC  ondansetron (ZOFRAN-ODT) disintegrating tablet 4 mg, 4 mg, Oral, Q8H PRN  potassium chloride (KLOR-CON M) extended release tablet 20 mEq, 20 mEq, Oral, BID WC   sodium bicarbonate infusion 75 mL/hr at 05/18/19 0111         Allergies. Allergies   Allergen Reactions    Contrast [Iodides]      Pt states she has kidney damage and it's not good for you        Social History.   Social History     Socioeconomic History    Marital status: Single     Spouse name: Not on file    Number of children: Not on file    Years of education: Not on file    Highest education level: Not on file   Occupational History    Not on file   Social Needs    Financial resource strain: Not on file    Food insecurity:     Worry: Not on file     Inability: Not on file    Transportation needs:     Medical: Not on file     Non-medical: Not on file   Tobacco Use    Smoking status: Current Some Day Smoker     Packs/day: 0.05     Types: Cigarettes    Smokeless tobacco: Never Used    Tobacco comment: 1-2 cigarettes per day   Substance and Sexual Activity    Alcohol use: No     Comment: rarely    Drug use: Yes     Types: Marijuana     Comment: occ    Sexual activity: Not Currently   Lifestyle    Physical activity:     Days per week: Not on file     Minutes per session: Not on file    Stress: Not on file   Relationships    Social connections:     Talks on phone: Not on file     Gets together: Not on file     Attends Anabaptist service: Not on file     Active member of club or organization: Not on file     Attends meetings of clubs or organizations: Not on file     Relationship status: Not on file    Intimate partner violence:     Fear of current or ex partner: Not on file     Emotionally abused: Not on file     Physically abused: Not on file     Forced sexual activity: Not on file   Other Topics Concern    Not on file   Social History Narrative    Not on file       Family History    Family History   Problem Relation Age of Onset    Kidney Disease
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
## 2019-05-18 NOTE — ED NOTES
Bed: 06  Expected date:   Expected time:   Means of arrival: Walk In  Comments:     Anne Davila RN  05/17/19 2792

## 2019-05-18 NOTE — PROGRESS NOTES
VSS, assessment completed, and meds given. Pt appears calm with no signs of distress. Breathing is regular and unlabored. Call light is within reach. No further needs at this time.

## 2019-05-18 NOTE — ED NOTES
Pt can be seen NSR HR 65 via 3A. Report called to 5T nurse. Questions answered. IV fluids infusing upon transport.       Rashaad Ingram, RN  05/18/19 8751

## 2019-05-19 LAB
ANION GAP SERPL CALCULATED.3IONS-SCNC: 17 MMOL/L (ref 3–16)
BASOPHILS ABSOLUTE: 0.1 K/UL (ref 0–0.2)
BASOPHILS RELATIVE PERCENT: 1 %
BUN BLDV-MCNC: 74 MG/DL (ref 7–20)
CALCIUM SERPL-MCNC: 9.2 MG/DL (ref 8.3–10.6)
CHLORIDE BLD-SCNC: 96 MMOL/L (ref 99–110)
CHLORIDE URINE RANDOM: 28 MMOL/L
CO2: 26 MMOL/L (ref 21–32)
CREAT SERPL-MCNC: 9.2 MG/DL (ref 0.6–1.1)
EOSINOPHILS ABSOLUTE: 0.1 K/UL (ref 0–0.6)
EOSINOPHILS RELATIVE PERCENT: 1.3 %
GFR AFRICAN AMERICAN: 6
GFR NON-AFRICAN AMERICAN: 5
GLUCOSE BLD-MCNC: 98 MG/DL (ref 70–99)
HCT VFR BLD CALC: 32.3 % (ref 36–48)
HEMOGLOBIN: 11 G/DL (ref 12–16)
LYMPHOCYTES ABSOLUTE: 1.2 K/UL (ref 1–5.1)
LYMPHOCYTES RELATIVE PERCENT: 15.4 %
MAGNESIUM: 2.2 MG/DL (ref 1.8–2.4)
MCH RBC QN AUTO: 29.2 PG (ref 26–34)
MCHC RBC AUTO-ENTMCNC: 34.2 G/DL (ref 31–36)
MCV RBC AUTO: 85.4 FL (ref 80–100)
MONOCYTES ABSOLUTE: 0.5 K/UL (ref 0–1.3)
MONOCYTES RELATIVE PERCENT: 7.2 %
NEUTROPHILS ABSOLUTE: 5.6 K/UL (ref 1.7–7.7)
NEUTROPHILS RELATIVE PERCENT: 75.1 %
PDW BLD-RTO: 12.7 % (ref 12.4–15.4)
PLATELET # BLD: 228 K/UL (ref 135–450)
PMV BLD AUTO: 10.6 FL (ref 5–10.5)
POTASSIUM REFLEX MAGNESIUM: 2.8 MMOL/L (ref 3.5–5.1)
POTASSIUM, UR: 11.5 MMOL/L
RBC # BLD: 3.78 M/UL (ref 4–5.2)
SODIUM BLD-SCNC: 139 MMOL/L (ref 136–145)
SODIUM URINE: 42 MMOL/L
URINE CULTURE, ROUTINE: NORMAL
WBC # BLD: 7.5 K/UL (ref 4–11)

## 2019-05-19 PROCEDURE — 6370000000 HC RX 637 (ALT 250 FOR IP): Performed by: HOSPITALIST

## 2019-05-19 PROCEDURE — 1200000000 HC SEMI PRIVATE

## 2019-05-19 PROCEDURE — 80048 BASIC METABOLIC PNL TOTAL CA: CPT

## 2019-05-19 PROCEDURE — 6360000002 HC RX W HCPCS: Performed by: NURSE PRACTITIONER

## 2019-05-19 PROCEDURE — 85025 COMPLETE CBC W/AUTO DIFF WBC: CPT

## 2019-05-19 PROCEDURE — 36415 COLL VENOUS BLD VENIPUNCTURE: CPT

## 2019-05-19 PROCEDURE — 2580000003 HC RX 258: Performed by: NURSE PRACTITIONER

## 2019-05-19 PROCEDURE — 83735 ASSAY OF MAGNESIUM: CPT

## 2019-05-19 RX ORDER — SODIUM CHLORIDE 9 MG/ML
INJECTION, SOLUTION INTRAVENOUS CONTINUOUS
Status: DISCONTINUED | OUTPATIENT
Start: 2019-05-19 | End: 2019-05-19 | Stop reason: SDUPTHER

## 2019-05-19 RX ORDER — POTASSIUM CHLORIDE AND SODIUM CHLORIDE 900; 300 MG/100ML; MG/100ML
INJECTION, SOLUTION INTRAVENOUS CONTINUOUS
Status: DISCONTINUED | OUTPATIENT
Start: 2019-05-19 | End: 2019-05-20 | Stop reason: HOSPADM

## 2019-05-19 RX ORDER — POTASSIUM CHLORIDE 20 MEQ/1
20 TABLET, EXTENDED RELEASE ORAL
Status: DISCONTINUED | OUTPATIENT
Start: 2019-05-19 | End: 2019-05-20 | Stop reason: HOSPADM

## 2019-05-19 RX ADMIN — POTASSIUM CHLORIDE 20 MEQ: 1500 TABLET, EXTENDED RELEASE ORAL at 13:18

## 2019-05-19 RX ADMIN — POTASSIUM CHLORIDE 20 MEQ: 1500 TABLET, EXTENDED RELEASE ORAL at 18:42

## 2019-05-19 RX ADMIN — Medication 1 G: at 01:40

## 2019-05-19 RX ADMIN — DEXTROAMPHETAMINE SACCHARATE, AMPHETAMINE ASPARTATE, DEXTROAMPHETAMINE SULFATE AND AMPHETAMINE SULFATE 20 MG: 2.5; 2.5; 2.5; 2.5 TABLET ORAL at 11:24

## 2019-05-19 RX ADMIN — POTASSIUM CHLORIDE 20 MEQ: 1500 TABLET, EXTENDED RELEASE ORAL at 11:24

## 2019-05-19 RX ADMIN — METOPROLOL TARTRATE 50 MG: 50 TABLET, FILM COATED ORAL at 11:24

## 2019-05-19 RX ADMIN — METOPROLOL TARTRATE 50 MG: 50 TABLET, FILM COATED ORAL at 21:49

## 2019-05-19 RX ADMIN — PANTOPRAZOLE SODIUM 40 MG: 40 TABLET, DELAYED RELEASE ORAL at 11:24

## 2019-05-19 RX ADMIN — HEPARIN SODIUM 5000 UNITS: 5000 INJECTION INTRAVENOUS; SUBCUTANEOUS at 11:23

## 2019-05-19 RX ADMIN — ASPIRIN 81 MG 81 MG: 81 TABLET ORAL at 11:24

## 2019-05-19 RX ADMIN — MULTIPLE VITAMINS W/ MINERALS TAB 1 TABLET: TAB at 13:18

## 2019-05-19 RX ADMIN — HEPARIN SODIUM 5000 UNITS: 5000 INJECTION INTRAVENOUS; SUBCUTANEOUS at 21:49

## 2019-05-19 RX ADMIN — Medication 10 ML: at 11:25

## 2019-05-19 NOTE — PROGRESS NOTES
Assessment completed, see flowsheet for details. Lung sounds are clear bilaterally. Bowel sounds are present in all quadrants. Pt A/O x4 No N/V reported. Pt denies pain at this moment. Pulses are palpable in all extremities. Call light within reach. Will continue to monitor.
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
## 2019-05-19 NOTE — PROGRESS NOTES
Patient's IV hub was loose. I tightened it and was in the middle of replacing a dressing over the IV site, when the patient moved her arm after being told not to and the IV fell out. Patient is refusing another PIV and is refusing any and all IV fluids. Dr. Zoya Colon was notified. His message says: \"okay\".

## 2019-05-19 NOTE — PLAN OF CARE
Problem: Falls - Risk of:  Goal: Will remain free from falls  Description  Will remain free from falls  5/18/2019 2324 by Nataliia Forde RN  Outcome: Ongoing

## 2019-05-19 NOTE — PROGRESS NOTES
MD Felix Robin MD Donold Ano, MD                                  Office: [phone]                 Fax: [phone]          [employer]                     NEPHROLOGY IN PATIENT PROGRESS NOTE:     PATIENT NAME: Frida Quick  : 1975  MRN: [id number]            Subjective:       Still complains of nausea. No diarrhea. No active vomiting. No hypotension. On IV bicarbonate infusion. Urine output. No shortness of breath. Assessment and Plan    Assessment:     End-stage kidney failure-advanced-severe-nonoliguric. Polycystic kidney disease. Refractory hypokalemia. Metabolic acidosis. (Improved)History of hypertension. Plan:       Generalized weakness. Hypokalemia. Refractory. Repeat potassium is 2.8. Discontinue the bicarbonate infusion. Continue potassium 40 mEq IV. (Monitor carefully due to severe renal failure)  Continue normal saline at 75 min/h. Patient has severe end-stage kidney failure. Repeat creatinine is 9.2. Patient refuses discussion about dialysis. She reports that she is feeling well and will fight this. Fluid status stable.  consult for placement (she reports that she is homeless). Once again encourage patient to discuss about dialysis. She is not interested. EXAM  Vitals:    19 1127   BP: 114/86   Pulse: 72   Resp: 18   Temp: 97.9 °F (36.6 °C)   SpO2: 90%       Intake/Output Summary (Last 24 hours) at 2019 1244  Last data filed at 2019 1919  Gross per 24 hour   Intake 935 ml   Output 350 ml   Net 585 ml       Not ill appearing. No respiratory distress  Awake alert   no hypotension  External exam of the ears and nose are normal  HENT: exam is normal  Eyes: Pupils are equal, round, and reactive to light. Lymph Nodes. No axillary or cervical lymph nodes are palpable. Neck. JVD not visible. No lymph nodes palpable.   CVS.  Heart sounds are normal.Palpation of the heart is BLOODU LARGE*   GLUCOSEU Negative       LIVER PROFILE:   Recent Labs     05/17/19 2118   AST 12*   ALT 7*   LIPASE 78.0*   BILITOT 0.4   ALKPHOS 80     PT/INR:  No results found for: PROTIME, INR  PTT:  No results found for: APTT  PRERNA:  No results found for: ANATITER, PRERNA  CHEMISTRY COMMON GROUP :   Lab Results   Component Value Date    GLUCOSE 98 05/19/2019     Recent Labs     05/17/19  2118 05/18/19  1410 05/19/19  0606   GLUCOSE 96 102* 98   CALCIUM 9.7 9.1 9.2         RADIOLOGY:        Imaging Results. Chest X Ray reviwed by me    Chest Xray Reviewed by me  Renal Ultrasound Reviewed by me    EKG reviewed by me.                 Electronically Signed: Elida Zamora MD 5/19/2019 12:44 PM

## 2019-05-19 NOTE — PROGRESS NOTES
Critical lab values:    Results for Douglas Gagnon (MRN [id number]) as of 5/19/2019 06:59   Ref. Range 5/19/2019 06:06   Potassium Latest Ref Range: 3.5 - 5.1 mmol/L 2.8 (LL)      Ref. Range 5/19/2019 06:06   Creatinine Latest Ref Range: 0.6 - 1.1 mg/dL 9.2 (HH)       [employer]:    \"Critical Lab Values: Creatinine 9.2 Potassium 2.8 Pt stable HR 65 NSR. \"

## 2019-05-19 NOTE — PROGRESS NOTES
[facility] HOSPITALISTS PROGRESS NOTE    5/19/2019 7:05 AM        Name: Kendra Delgado . Admitted: 5/17/2019  Primary Care Provider: Minor Cano (Tel: [phone])                        Hospital course:  37 y.o. female with PMHx of brain aneurysms, polycystic kidney disease, stroke, depression, chronic kidney disease stage V,  presented to [facility] emergency room with complaints of 3 day history of emesis. She denies any abdominal pain denies any diarrhea or fevers. Case discussed with the nephrologist patient refusing hemodialysis today creatinine level 9.2 slightly better than yesterday     Subjective:  . No acute events overnight. Resting well. Pain control. Diet ok. Labs reviewed  Denies any chest pain sob.      Reviewed interval ancillary notes    Current Medications    sodium bicarbonate 150 mEq, potassium chloride 40 mEq in dextrose 5 % 1,000 mL infusion Continuous   sodium chloride flush 0.9 % injection 10 mL 2 times per day   sodium chloride flush 0.9 % injection 10 mL PRN   ondansetron (ZOFRAN) injection 4 mg Q6H PRN   acetaminophen (TYLENOL) tablet 650 mg Q4H PRN   cefTRIAXone (ROCEPHIN) 1 g in sterile water 10 mL IV syringe Q24H   senna (SENOKOT) tablet 8.6 mg Daily PRN   heparin (porcine) injection 5,000 Units BID   amphetamine-dextroamphetamine (ADDERALL) tablet 20 mg Daily   aspirin chewable tablet 81 mg Daily   metoprolol tartrate (LOPRESSOR) tablet 50 mg BID   therapeutic multivitamin-minerals 1 tablet Lunch   pantoprazole (PROTONIX) tablet 40 mg QAM AC   ondansetron (ZOFRAN-ODT) disintegrating tablet 4 mg Q8H PRN   potassium chloride (KLOR-CON M) extended release tablet 20 mEq BID WC       Objective:  /80   Pulse 69   Temp 97.8 °F (36.6 °C) (Oral)   Resp 18   Ht 5' 4\" (1.626 m)   Wt 127 lb 6.4 oz (57.8 kg)   LMP 05/02/2019   SpO2 98%   BMI 21.87 kg/m²     Intake/Output Summary (Last 24 hours) at 5/19/2019 0705  Last data filed at 5/18/2019 1919  Gross per 24 hour   Intake 935 ml   Output 350 ml   Net 585 ml    Wt Readings from Last 3 Encounters:   05/17/19 127 lb 6.4 oz (57.8 kg)   05/26/16 150 lb (68 kg)   12/09/14 140 lb (63.5 kg)       General appearance:  Appears comfortable  Eyes: Sclera clear. Pupils equal.  ENT: Moist oral mucosa. Trachea midline, no adenopathy. Cardiovascular: Regular rhythm, normal S1, S2. No murmur. No edema in lower extremities  Respiratory: Not using accessory muscles. Good inspiratory effort. Clear to auscultation bilaterally, no wheeze or crackles. GI: Abdomen soft, no tenderness, not distended, normal bowel sounds  Musculoskeletal: No cyanosis in digits, neck supple  Neurology: CN 2-12 grossly intact. No speech or motor deficits  Psych: Normal affect. Alert and oriented in time, place and person  Skin: Warm, dry, normal turgor    Labs and Tests:  CBC:   Recent Labs     05/17/19 2118 05/19/19  0606   WBC 12.3* 7.5   HGB 12.0 11.0*    228     BMP:  Recent Labs     05/17/19 2118 05/18/19  1410 05/19/19  0606   * 138 139   K 2.6* 3.0* 2.8*   CL 90* 94* 96*   CO2 17* 24 26   BUN 84* 80* 74*   CREATININE 9.5* 9.6* 9.2*   GLUCOSE 96 102* 98     Hepatic: Recent Labs     05/17/19 2118   AST 12*   ALT 7*   BILITOT 0.4   ALKPHOS 80         Problem List  Active Problems:    UTI (urinary tract infection)    Hypokalemia    Acute renal failure (ARF) (HCC)    Essential hypertension  Resolved Problems:    * No resolved hospital problems. *          Assessment & Plan:      1. Urinary tract infection: Urine and blood cultures pending , c/w  Rocephin  2. CHANTEL on CKD   at baseline   she refuses dialysis, nephrology following  3. Hypokalemia:  sodium bicarbonate drip  continued with the IV potassium chloride. However still hypokalemia continue with the p.o. potassium as well   4.  Nausea and vomiting improved, continue Zofran as needed, maintenance of fluids and electrolytes. We will resume liquid diet  5. Hypertension:-Meds on hold until nephrology evaluates patient   6. Home medication for ADD resumed  7.   We will consult  for help her housing  and since she says she is homeless         Diet: DIET FULL LIQUID;  Code:Full Code  DVT PPX trevon Kurtz MD   5/19/2019 7:05 AM

## 2019-05-20 VITALS
OXYGEN SATURATION: 98 % | TEMPERATURE: 98 F | HEIGHT: 64 IN | BODY MASS INDEX: 21.53 KG/M2 | RESPIRATION RATE: 18 BRPM | HEART RATE: 75 BPM | SYSTOLIC BLOOD PRESSURE: 138 MMHG | DIASTOLIC BLOOD PRESSURE: 86 MMHG | WEIGHT: 126.1 LBS

## 2019-05-20 LAB
ANION GAP SERPL CALCULATED.3IONS-SCNC: 18 MMOL/L (ref 3–16)
BUN BLDV-MCNC: 69 MG/DL (ref 7–20)
CALCIUM SERPL-MCNC: 10.1 MG/DL (ref 8.3–10.6)
CHLORIDE BLD-SCNC: 98 MMOL/L (ref 99–110)
CO2: 24 MMOL/L (ref 21–32)
CREAT SERPL-MCNC: 9.8 MG/DL (ref 0.6–1.1)
GFR AFRICAN AMERICAN: 5
GFR NON-AFRICAN AMERICAN: 4
GLUCOSE BLD-MCNC: 93 MG/DL (ref 70–99)
POTASSIUM SERPL-SCNC: 3.3 MMOL/L (ref 3.5–5.1)
SODIUM BLD-SCNC: 140 MMOL/L (ref 136–145)

## 2019-05-20 PROCEDURE — 6360000002 HC RX W HCPCS: Performed by: NURSE PRACTITIONER

## 2019-05-20 PROCEDURE — 36415 COLL VENOUS BLD VENIPUNCTURE: CPT

## 2019-05-20 PROCEDURE — 80048 BASIC METABOLIC PNL TOTAL CA: CPT

## 2019-05-20 PROCEDURE — 6370000000 HC RX 637 (ALT 250 FOR IP): Performed by: HOSPITALIST

## 2019-05-20 RX ORDER — POTASSIUM CHLORIDE 20 MEQ/1
20 TABLET, EXTENDED RELEASE ORAL DAILY
Qty: 30 TABLET | Refills: 0 | Status: SHIPPED | OUTPATIENT
Start: 2019-05-20 | End: 2021-10-12 | Stop reason: ALTCHOICE

## 2019-05-20 RX ADMIN — ASPIRIN 81 MG 81 MG: 81 TABLET ORAL at 09:38

## 2019-05-20 RX ADMIN — DEXTROAMPHETAMINE SACCHARATE, AMPHETAMINE ASPARTATE, DEXTROAMPHETAMINE SULFATE AND AMPHETAMINE SULFATE 20 MG: 2.5; 2.5; 2.5; 2.5 TABLET ORAL at 09:39

## 2019-05-20 RX ADMIN — HEPARIN SODIUM 5000 UNITS: 5000 INJECTION INTRAVENOUS; SUBCUTANEOUS at 09:39

## 2019-05-20 RX ADMIN — POTASSIUM CHLORIDE 20 MEQ: 1500 TABLET, EXTENDED RELEASE ORAL at 09:38

## 2019-05-20 RX ADMIN — METOPROLOL TARTRATE 50 MG: 50 TABLET, FILM COATED ORAL at 09:38

## 2019-05-20 NOTE — DISCHARGE SUMMARY
[facility]    Patient Demographics    Patient. Jag Welch  Date of Birth. 1975  MRN. [id number]     Primary care provider. Dougmay Causey  (Tel: [phone])    Admit date: 5/17/2019    Discharge date (blank if same as Note Date): Note Date: 5/20/2019     Reason for Hospitalization. Chief Complaint   Patient presents with    Emesis     emesis x3 days, denies diarrhea or abd pain          Significant Findings. Acute uremia  Nausea and vomiting sec to Uremia  Metabolic acidosis due to renal failure  ESRD refusing RRT    Problems and results from this hospitalization that need follow up. 1. ESRD follow up    Significant test results and incidental findings. 1.   US RENAL COMPLETE   Final Result   Enlarged kidneys containing innumerable cysts of varying sizes compatible   with polycystic kidney disease. Invasive procedures and treatments. 1. None    Problem-based Hospital Course. 37 female h/o polycystic kidney disease ESRD, with refusal of HD, presented with uremia symptoms along with metabolic acidosis which did improve with hydration and bicarb, Nephrology did see the patient during the stay. Hypokalemia probably related to severe vomiting. She continues to refuse RRT. Unfortunately, not much we can do at this time, will d/c patient. Expect her to be back soon given her renal disease. Consults. IP CONSULT TO NEPHROLOGY  IP CONSULT TO HOSPITALIST  PHARMACY TO DOSE MEDICATION  IP CONSULT TO SOCIAL WORK  IP CONSULT TO SOCIAL WORK    Physical examination on discharge day. /86   Pulse 75   Temp 98 °F (36.7 °C) (Oral)   Resp 18   Ht 5' 4\" (1.626 m)   Wt 126 lb 1.6 oz (57.2 kg)   LMP 05/02/2019   SpO2 98%   BMI 21.65 kg/m²   General appearance. Alert. Looks comfortable. HEENT. Sclera clear. Moist mucus membranes. Cardiovascular. Regular rate and rhythm, normal S1, S2. No murmur. Respiratory.  Not using accessory
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
## 2019-05-20 NOTE — PROGRESS NOTES
Shift assessment completed, see doc flow sheet. VSS. Pt took PO medications without difficulty. Still states she does not want dialysis, states she has been maintaining kidney function with diet and large amounts of water consumption. States its been 10 years and she is doing just fine. Pt eager to leave today, still unsure where she will go but states she will figure out when the time comes. Social work updated. Pt denies any needs. Call light within reach.

## 2019-05-20 NOTE — PROGRESS NOTES
[facility]    5/20/2019 9:31 AM        Name: Duglas Chow . Admitted: 5/17/2019  Primary Care Provider: Leny Ferrer (Tel: [phone])    Brief Course:        CC: nausea, vomiting     Subjective:  . Patient feels better   Nausea is better   Metabolic acidosis resolved  Recent admission at [place] [address] interval ancillary notes    Current Medications    potassium chloride (KLOR-CON M) extended release tablet 20 mEq TID WC   0.9% NaCl with KCl 40 mEq infusion Continuous   sodium chloride flush 0.9 % injection 10 mL 2 times per day   sodium chloride flush 0.9 % injection 10 mL PRN   ondansetron (ZOFRAN) injection 4 mg Q6H PRN   acetaminophen (TYLENOL) tablet 650 mg Q4H PRN   cefTRIAXone (ROCEPHIN) 1 g in sterile water 10 mL IV syringe Q24H   senna (SENOKOT) tablet 8.6 mg Daily PRN   heparin (porcine) injection 5,000 Units BID   amphetamine-dextroamphetamine (ADDERALL) tablet 20 mg Daily   aspirin chewable tablet 81 mg Daily   metoprolol tartrate (LOPRESSOR) tablet 50 mg BID   therapeutic multivitamin-minerals 1 tablet Lunch   pantoprazole (PROTONIX) tablet 40 mg QAM AC   ondansetron (ZOFRAN-ODT) disintegrating tablet 4 mg Q8H PRN       Objective:  /78   Pulse 74   Temp 98 °F (36.7 °C) (Oral)   Resp 18   Ht 5' 4\" (1.626 m)   Wt 126 lb 1.6 oz (57.2 kg)   LMP 05/02/2019   SpO2 99%   BMI 21.65 kg/m²   No intake or output data in the 24 hours ending 05/20/19 0931 Wt Readings from Last 3 Encounters:   05/20/19 126 lb 1.6 oz (57.2 kg)   05/26/16 150 lb (68 kg)   12/09/14 140 lb (63.5 kg)       General appearance:  Appears comfortable  Eyes: Sclera clear. Pupils equal.  ENT: Moist oral mucosa. Trachea midline, no adenopathy. Cardiovascular: Regular rhythm, normal S1, S2. No murmur. No edema in lower extremities  Respiratory: Not using accessory muscles.  Good inspiratory effort. Clear to auscultation bilaterally, no wheeze or crackles. GI: Abdomen soft, no tenderness, not distended, normal bowel sounds  Musculoskeletal: No cyanosis in digits, neck supple  Neurology: CN 2-12 grossly intact. No speech or motor deficits  Psych: Normal affect. Alert and oriented in time, place and person  Skin: Warm, dry, normal turgor  Extremity exam shows brisk capillary refill. Peripheral pulses are palpable in lower extremities     Labs and Tests:  CBC:   Recent Labs     05/17/19  2118 05/19/19  0606   WBC 12.3* 7.5   HGB 12.0 11.0*    228     BMP:  Recent Labs     05/18/19  1410 05/19/19  0606 05/20/19  0533    139 140   K 3.0* 2.8* 3.3*   CL 94* 96* 98*   CO2 24 26 24   BUN 80* 74* 69*   CREATININE 9.6* 9.2* 9.8*   GLUCOSE 102* 98 93     Hepatic: Recent Labs     05/17/19 2118   AST 12*   ALT 7*   BILITOT 0.4   ALKPHOS 80     US RENAL COMPLETE   Final Result   Enlarged kidneys containing innumerable cysts of varying sizes compatible   with polycystic kidney disease. Problem List  Active Problems:    UTI (urinary tract infection)    Hypokalemia    Acute renal failure (ARF) (HCC)    Essential hypertension  Resolved Problems:    * No resolved hospital problems. *       Assessment & Plan:   ARF with uremia on presentation with refusal to do RRT with h/o Polycystic kidney disease  Patient asymptomatic at this time,     Hypokalemia  On scheduled Kcl replacement         IV Access:   [place]:  Diet: DIET FULL LIQUID;  Code:Full Code  DVT PPX Lovenox   Disposition once ok with nephrology,       Mariela Ferrell MD   5/20/2019 9:31 AM

## 2019-05-20 NOTE — PLAN OF CARE
Problem: Falls - Risk of:  Goal: Will remain free from falls  Description  Will remain free from falls  Outcome: Ongoing  Note:   Pt was instructed to call for assistance as needed. Will continue to monitor.   Goal: Absence of physical injury  Description  Absence of physical injury  Outcome: Ongoing

## 2019-05-20 NOTE — CARE COORDINATION
[address] Transitions Interview     2019    Patient: Andreea Warner Patient : 1975   MRN: [id number]  Reason for Admission: UTI, hypokalemia, ARF, hypertension  RARS: Readmission Risk Score: 15         Spoke with: Andreea Warner        Readmission Risk  Patient Active Problem List   Diagnosis    PKD (polycystic kidney disease)    H/O: CVA (cerebrovascular accident)    UTI (urinary tract infection)    Hypokalemia    [facility] ([facility]F) ([facility] [address]    Essential hypertension       Inpatient Assessment  Care Transitions Summary    Care Transitions Inpatient Review  Medication Review  Are you able to afford your medications?:  Yes  How often do you have difficulty taking your medications?:  I always take them as prescribed. Housing Review  Who do you live with?:  Partner/Spouse/SO  Are you an active caregiver in your home?:  No  Social Support  Do [facility] have a [facility]  No  Durable Medical Equipment  Functional Review  Ability to seek help/take action for Emergent/Urgent situations i.e. fire, crime, inclement weather or health crisis. :  Independent  Ability handle personal hygiene needs (bathing/dressing/grooming): Independent  Ability to manage medications: Independent  Ability to prepare food:  Independent  Ability to maintain home (clean home, laundry): Independent  Ability to drive and/or has transportation:  Needs Assistance  Ability to do shopping:  Independent  Ability to manage finances: Independent  Is patient able to live independently?:  Yes  Hearing and Vision  Visual Impairment:  Visual impairment (Glasses/contacts)  Hearing Impairment:  None  Care Transitions Interventions         Follow Up: Met with patient to discuss care transition. Role of CTC and care transition program explained to patient. Patient/Responsible Party informed about the [facility] Medicare Initiative. Explained program and provided [address] Notification Letter.   Patient is very angry, upset with family, having flight of ideas, paranoia. CTC will monitor for discharge and follow up accordingly. No future appointments.     Health Maintenance  Health Maintenance Due   Topic Date Due    Lipid screen  09/08/2015       Le Buck RN

## 2019-05-20 NOTE — PROGRESS NOTES
Assessment completed, see flowsheet for details. Lung sounds are clear bilaterally. Bowel sounds are present in all quadrants. Pt A/O x4 No N/V reported, seems agitated because the lack of changes on her diet. Pt reported a 0 on a 0-10 pain scale. Pulses are palpable in all extremities. Pt refusing IV access, MD aware. Call light within reach. Will continue to monitor.
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
## 2019-05-20 NOTE — PROGRESS NOTES
Discharge instructions given to pt. Educated pt on importance of following up with Dr. Patsy Perry at [facility]. Offered to give pt a lyft voucher but pt refused, states she isn't exactly sure where she was going but will figure it out after getting some fresh air.

## 2019-05-21 ENCOUNTER — CARE COORDINATION (OUTPATIENT)
Dept: CASE MANAGEMENT | Age: 44
End: 2019-05-21

## 2019-05-22 NOTE — CARE COORDINATION
[address] [facility] Transitions Initial Follow Up Call    Call within 2 business days of discharge: Yes    Patient: Justina Hogan Patient : 1975   MRN: [id number]  Reason for Admission:   Discharge Date: 19 RARS: Readmission Risk Score: 14      Last Discharge [facility]       Complaint Diagnosis Description Type Department Provider    19 Emesis Acute cystitis without hematuria . .. ED to Hosp-Admission (Discharged) (ADMITTED) [facility] Bahman Wang MD; Vladimir Sandhoff . .. Facility:         Care Transitions 24 Hour Call    Do you have support at home?:  Partner/Spouse/SO  Are you an active caregiver in your home?:  No  Care Transitions Interventions         Follow Up: BPCI-A 2nd attempt at 24 hour discharge call, no answer at first contact number, CTC left  with contact information and request for return call. CTC attempted 2nd contact number (mobile) [phone], unidentified male answered and said this was \"the wrong number\". CTC will transition to Central Team for follow up. No future appointments.     Katherine Aldridge RN

## 2019-06-14 ENCOUNTER — CARE COORDINATION (OUTPATIENT)
Dept: CASE MANAGEMENT | Age: 44
End: 2019-06-14

## 2019-06-14 NOTE — CARE COORDINATION
[facility] [address] Transitions Follow Up Call    2019    Patient: Andreea Warner  Patient : 1975   MRN: [id number]  Reason for Admission:   Discharge Date: 19 RARS: Readmission Risk Score: 14             Care Transitions Subsequent and Final Call    Subsequent and Final Calls  Care Transitions Interventions  Other Interventions: Follow Up : Attempted to follow up with patient, was unable to leave a message at home phone, the mobile number listed does not belong to the patient, a male answered and said you must have the wrong number I don't know a [facility]. Will re attempt at a later time   No future appointments.     Dorene Pastor RN

## 2019-06-17 PROBLEM — N39.0 UTI (URINARY TRACT INFECTION): Status: RESOLVED | Noted: 2019-05-18 | Resolved: 2019-06-17

## 2019-07-11 ENCOUNTER — CARE COORDINATION (OUTPATIENT)
Dept: CASE MANAGEMENT | Age: 44
End: 2019-07-11

## 2019-07-31 ENCOUNTER — CARE COORDINATION (OUTPATIENT)
Dept: CASE MANAGEMENT | Age: 44
End: 2019-07-31

## 2019-07-31 NOTE — CARE COORDINATION
[facility] Transitions Follow Up Call    2019    Patient: Jose Verma  Patient : 1975   MRN: <[id number]>  Reason for Admission:   Discharge Date: 19 RARS: Readmission Risk Score: 14         Attempted to contact patient for BPCI transitions call. Contact information left to  requesting call back at the earliest convenience. Willis Almodovar RN BSN   Care Transitions Nurse  [phone]         Care Transitions Subsequent and Final Call    Subsequent and Final Calls  Care Transitions Interventions  Other Interventions: Follow Up  No future appointments.     Willis Almodovar RN

## 2019-10-22 ENCOUNTER — CARE COORDINATION (OUTPATIENT)
Dept: CASE MANAGEMENT | Age: 44
End: 2019-10-22

## 2021-03-30 PROBLEM — N18.6 END STAGE RENAL DISEASE (HCC): Status: ACTIVE | Noted: 2021-03-30

## 2021-10-11 ENCOUNTER — HOSPITAL ENCOUNTER (EMERGENCY)
Age: 46
Discharge: LEFT BEFORE DISCHARGE | DRG: 640 | End: 2021-10-11
Payer: MEDICARE

## 2021-10-11 PROCEDURE — 93005 ELECTROCARDIOGRAM TRACING: CPT | Performed by: EMERGENCY MEDICINE

## 2021-10-12 ENCOUNTER — HOSPITAL ENCOUNTER (INPATIENT)
Age: 46
LOS: 2 days | Discharge: HOME | DRG: 640 | End: 2021-10-14
Attending: EMERGENCY MEDICINE | Admitting: INTERNAL MEDICINE
Payer: MEDICARE

## 2021-10-12 DIAGNOSIS — N18.9 ACUTE RENAL FAILURE SUPERIMPOSED ON CHRONIC KIDNEY DISEASE, UNSPECIFIED CKD STAGE, UNSPECIFIED ACUTE RENAL FAILURE TYPE (HCC): ICD-10-CM

## 2021-10-12 DIAGNOSIS — N17.9 ACUTE RENAL FAILURE SUPERIMPOSED ON CHRONIC KIDNEY DISEASE, UNSPECIFIED CKD STAGE, UNSPECIFIED ACUTE RENAL FAILURE TYPE (HCC): ICD-10-CM

## 2021-10-12 DIAGNOSIS — E87.5 ACUTE HYPERKALEMIA: Primary | ICD-10-CM

## 2021-10-12 LAB
A/G RATIO: 1.5 (ref 1.1–2.2)
ALBUMIN SERPL-MCNC: 4.9 G/DL (ref 3.4–5)
ALP BLD-CCNC: 167 U/L (ref 40–129)
ALT SERPL-CCNC: 9 U/L (ref 10–40)
ANION GAP SERPL CALCULATED.3IONS-SCNC: 28 MMOL/L (ref 3–16)
AST SERPL-CCNC: 11 U/L (ref 15–37)
BACTERIA: ABNORMAL /HPF
BASOPHILS ABSOLUTE: 0.1 K/UL (ref 0–0.2)
BASOPHILS RELATIVE PERCENT: 1 %
BILIRUB SERPL-MCNC: 0.3 MG/DL (ref 0–1)
BILIRUBIN URINE: NEGATIVE
BLOOD, URINE: ABNORMAL
BUN BLDV-MCNC: 123 MG/DL (ref 7–20)
CALCIUM SERPL-MCNC: 9.7 MG/DL (ref 8.3–10.6)
CHLORIDE BLD-SCNC: 91 MMOL/L (ref 99–110)
CHLORIDE URINE RANDOM: 81 MMOL/L
CLARITY: ABNORMAL
CO2: 12 MMOL/L (ref 21–32)
COLOR: YELLOW
CREAT SERPL-MCNC: 16.4 MG/DL (ref 0.6–1.1)
EKG ATRIAL RATE: 89 BPM
EKG DIAGNOSIS: NORMAL
EKG P AXIS: 44 DEGREES
EKG P-R INTERVAL: 146 MS
EKG Q-T INTERVAL: 374 MS
EKG QRS DURATION: 82 MS
EKG QTC CALCULATION (BAZETT): 455 MS
EKG R AXIS: -7 DEGREES
EKG T AXIS: 29 DEGREES
EKG VENTRICULAR RATE: 89 BPM
EOSINOPHILS ABSOLUTE: 0.1 K/UL (ref 0–0.6)
EOSINOPHILS RELATIVE PERCENT: 1.6 %
EPITHELIAL CELLS, UA: 3 /HPF (ref 0–5)
GFR AFRICAN AMERICAN: 3
GFR NON-AFRICAN AMERICAN: 2
GLOBULIN: 3.2 G/DL
GLUCOSE BLD-MCNC: 76 MG/DL (ref 70–99)
GLUCOSE URINE: 100 MG/DL
HCT VFR BLD CALC: 41 % (ref 36–48)
HEMOGLOBIN: 13.5 G/DL (ref 12–16)
HYALINE CASTS: 1 /LPF (ref 0–8)
KETONES, URINE: NEGATIVE MG/DL
LEUKOCYTE ESTERASE, URINE: ABNORMAL
LYMPHOCYTES ABSOLUTE: 1.3 K/UL (ref 1–5.1)
LYMPHOCYTES RELATIVE PERCENT: 14.9 %
MCH RBC QN AUTO: 29.7 PG (ref 26–34)
MCHC RBC AUTO-ENTMCNC: 33 G/DL (ref 31–36)
MCV RBC AUTO: 89.9 FL (ref 80–100)
MICROSCOPIC EXAMINATION: YES
MONOCYTES ABSOLUTE: 0.6 K/UL (ref 0–1.3)
MONOCYTES RELATIVE PERCENT: 6.9 %
NEUTROPHILS ABSOLUTE: 6.5 K/UL (ref 1.7–7.7)
NEUTROPHILS RELATIVE PERCENT: 75.6 %
NITRITE, URINE: NEGATIVE
PDW BLD-RTO: 15.5 % (ref 12.4–15.4)
PH UA: 7.5 (ref 5–8)
PLATELET # BLD: 228 K/UL (ref 135–450)
PMV BLD AUTO: 10.1 FL (ref 5–10.5)
POTASSIUM REFLEX MAGNESIUM: 7 MMOL/L (ref 3.5–5.1)
POTASSIUM SERPL-SCNC: 5.1 MMOL/L (ref 3.5–5.1)
POTASSIUM, UR: 16.3 MMOL/L
PROTEIN UA: >=300 MG/DL
RBC # BLD: 4.56 M/UL (ref 4–5.2)
RBC UA: 8 /HPF (ref 0–4)
SODIUM BLD-SCNC: 131 MMOL/L (ref 136–145)
SODIUM URINE: 107 MMOL/L
SPECIFIC GRAVITY UA: 1.01 (ref 1–1.03)
TOTAL PROTEIN: 8.1 G/DL (ref 6.4–8.2)
URINE REFLEX TO CULTURE: YES
URINE TYPE: ABNORMAL
UROBILINOGEN, URINE: 0.2 E.U./DL
WBC # BLD: 8.6 K/UL (ref 4–11)
WBC UA: 89 /HPF (ref 0–5)

## 2021-10-12 PROCEDURE — 94640 AIRWAY INHALATION TREATMENT: CPT

## 2021-10-12 PROCEDURE — 94761 N-INVAS EAR/PLS OXIMETRY MLT: CPT

## 2021-10-12 PROCEDURE — 93010 ELECTROCARDIOGRAM REPORT: CPT | Performed by: INTERNAL MEDICINE

## 2021-10-12 PROCEDURE — 80053 COMPREHEN METABOLIC PANEL: CPT

## 2021-10-12 PROCEDURE — 36415 COLL VENOUS BLD VENIPUNCTURE: CPT

## 2021-10-12 PROCEDURE — 6360000002 HC RX W HCPCS: Performed by: EMERGENCY MEDICINE

## 2021-10-12 PROCEDURE — 1200000000 HC SEMI PRIVATE

## 2021-10-12 PROCEDURE — 87086 URINE CULTURE/COLONY COUNT: CPT

## 2021-10-12 PROCEDURE — 6370000000 HC RX 637 (ALT 250 FOR IP): Performed by: INTERNAL MEDICINE

## 2021-10-12 PROCEDURE — 5A1D70Z PERFORMANCE OF URINARY FILTRATION, INTERMITTENT, LESS THAN 6 HOURS PER DAY: ICD-10-PCS | Performed by: INTERNAL MEDICINE

## 2021-10-12 PROCEDURE — 84132 ASSAY OF SERUM POTASSIUM: CPT

## 2021-10-12 PROCEDURE — 2500000003 HC RX 250 WO HCPCS: Performed by: EMERGENCY MEDICINE

## 2021-10-12 PROCEDURE — 84300 ASSAY OF URINE SODIUM: CPT

## 2021-10-12 PROCEDURE — 2580000003 HC RX 258: Performed by: INTERNAL MEDICINE

## 2021-10-12 PROCEDURE — 85025 COMPLETE CBC W/AUTO DIFF WBC: CPT

## 2021-10-12 PROCEDURE — 6360000002 HC RX W HCPCS: Performed by: INTERNAL MEDICINE

## 2021-10-12 PROCEDURE — 84133 ASSAY OF URINE POTASSIUM: CPT

## 2021-10-12 PROCEDURE — 6370000000 HC RX 637 (ALT 250 FOR IP): Performed by: NURSE PRACTITIONER

## 2021-10-12 PROCEDURE — 2500000003 HC RX 250 WO HCPCS: Performed by: INTERNAL MEDICINE

## 2021-10-12 PROCEDURE — 90935 HEMODIALYSIS ONE EVALUATION: CPT

## 2021-10-12 PROCEDURE — 82436 ASSAY OF URINE CHLORIDE: CPT

## 2021-10-12 PROCEDURE — 6370000000 HC RX 637 (ALT 250 FOR IP): Performed by: EMERGENCY MEDICINE

## 2021-10-12 PROCEDURE — 99284 EMERGENCY DEPT VISIT MOD MDM: CPT

## 2021-10-12 PROCEDURE — 81001 URINALYSIS AUTO W/SCOPE: CPT

## 2021-10-12 RX ORDER — ACETAMINOPHEN 325 MG/1
650 TABLET ORAL EVERY 6 HOURS PRN
Status: DISCONTINUED | OUTPATIENT
Start: 2021-10-12 | End: 2021-10-14 | Stop reason: HOSPADM

## 2021-10-12 RX ORDER — ONDANSETRON 2 MG/ML
4 INJECTION INTRAMUSCULAR; INTRAVENOUS EVERY 6 HOURS PRN
Status: DISCONTINUED | OUTPATIENT
Start: 2021-10-12 | End: 2021-10-14 | Stop reason: HOSPADM

## 2021-10-12 RX ORDER — DEXTROSE MONOHYDRATE 25 G/50ML
25 INJECTION, SOLUTION INTRAVENOUS ONCE
Status: COMPLETED | OUTPATIENT
Start: 2021-10-12 | End: 2021-10-12

## 2021-10-12 RX ORDER — SODIUM CHLORIDE 0.9 % (FLUSH) 0.9 %
5-40 SYRINGE (ML) INJECTION PRN
Status: DISCONTINUED | OUTPATIENT
Start: 2021-10-12 | End: 2021-10-14 | Stop reason: HOSPADM

## 2021-10-12 RX ORDER — HEPARIN SODIUM 1000 [USP'U]/ML
4600 INJECTION, SOLUTION INTRAVENOUS; SUBCUTANEOUS PRN
Status: DISCONTINUED | OUTPATIENT
Start: 2021-10-12 | End: 2021-10-14 | Stop reason: HOSPADM

## 2021-10-12 RX ORDER — CYCLOBENZAPRINE HCL 10 MG
10 TABLET ORAL 3 TIMES DAILY PRN
Status: COMPLETED | OUTPATIENT
Start: 2021-10-12 | End: 2021-10-13

## 2021-10-12 RX ORDER — CALCIUM ACETATE 667 MG/1
667 CAPSULE ORAL
Status: DISCONTINUED | OUTPATIENT
Start: 2021-10-12 | End: 2021-10-14

## 2021-10-12 RX ORDER — OXYCODONE HYDROCHLORIDE 5 MG/1
5 TABLET ORAL 2 TIMES DAILY
COMMUNITY

## 2021-10-12 RX ORDER — ACETAMINOPHEN 650 MG/1
650 SUPPOSITORY RECTAL EVERY 6 HOURS PRN
Status: DISCONTINUED | OUTPATIENT
Start: 2021-10-12 | End: 2021-10-14 | Stop reason: HOSPADM

## 2021-10-12 RX ORDER — CALCIUM GLUCONATE 94 MG/ML
1000 INJECTION, SOLUTION INTRAVENOUS ONCE
Status: COMPLETED | OUTPATIENT
Start: 2021-10-12 | End: 2021-10-12

## 2021-10-12 RX ORDER — ONDANSETRON 4 MG/1
4 TABLET, ORALLY DISINTEGRATING ORAL EVERY 8 HOURS PRN
Status: DISCONTINUED | OUTPATIENT
Start: 2021-10-12 | End: 2021-10-14 | Stop reason: HOSPADM

## 2021-10-12 RX ORDER — SODIUM CHLORIDE 0.9 % (FLUSH) 0.9 %
5-40 SYRINGE (ML) INJECTION EVERY 12 HOURS SCHEDULED
Status: DISCONTINUED | OUTPATIENT
Start: 2021-10-12 | End: 2021-10-14 | Stop reason: HOSPADM

## 2021-10-12 RX ORDER — CALCIUM ACETATE 667 MG/1
667 CAPSULE ORAL
Status: ON HOLD | COMMUNITY
End: 2021-10-14 | Stop reason: HOSPADM

## 2021-10-12 RX ORDER — SODIUM CHLORIDE 9 MG/ML
25 INJECTION, SOLUTION INTRAVENOUS PRN
Status: DISCONTINUED | OUTPATIENT
Start: 2021-10-12 | End: 2021-10-14 | Stop reason: HOSPADM

## 2021-10-12 RX ORDER — HEPARIN SODIUM 5000 [USP'U]/ML
5000 INJECTION, SOLUTION INTRAVENOUS; SUBCUTANEOUS EVERY 8 HOURS SCHEDULED
Status: DISCONTINUED | OUTPATIENT
Start: 2021-10-12 | End: 2021-10-14 | Stop reason: HOSPADM

## 2021-10-12 RX ADMIN — DEXTROSE MONOHYDRATE 25 G: 25 INJECTION, SOLUTION INTRAVENOUS at 09:57

## 2021-10-12 RX ADMIN — SODIUM ZIRCONIUM CYCLOSILICATE 5 G: 5 POWDER, FOR SUSPENSION ORAL at 09:55

## 2021-10-12 RX ADMIN — CYCLOBENZAPRINE 10 MG: 10 TABLET, FILM COATED ORAL at 22:38

## 2021-10-12 RX ADMIN — INSULIN HUMAN 10 UNITS: 100 INJECTION, SOLUTION PARENTERAL at 09:57

## 2021-10-12 RX ADMIN — METOPROLOL TARTRATE 25 MG: 25 TABLET, FILM COATED ORAL at 21:05

## 2021-10-12 RX ADMIN — Medication 10 ML: at 21:06

## 2021-10-12 RX ADMIN — CALCIUM ACETATE 667 MG: 667 CAPSULE ORAL at 17:53

## 2021-10-12 RX ADMIN — ONDANSETRON 4 MG: 2 INJECTION INTRAMUSCULAR; INTRAVENOUS at 19:28

## 2021-10-12 RX ADMIN — ALBUTEROL SULFATE 5 MG: 2.5 SOLUTION RESPIRATORY (INHALATION) at 11:25

## 2021-10-12 RX ADMIN — CALCIUM GLUCONATE 1000 MG: 98 INJECTION, SOLUTION INTRAVENOUS at 09:56

## 2021-10-12 RX ADMIN — ALTEPLASE 2 MG: 2.2 INJECTION, POWDER, LYOPHILIZED, FOR SOLUTION INTRAVENOUS at 15:57

## 2021-10-12 RX ADMIN — HEPARIN SODIUM 5000 UNITS: 5000 INJECTION INTRAVENOUS; SUBCUTANEOUS at 21:06

## 2021-10-12 ASSESSMENT — PAIN DESCRIPTION - LOCATION
LOCATION: LEG

## 2021-10-12 ASSESSMENT — PAIN SCALES - GENERAL
PAINLEVEL_OUTOF10: 7
PAINLEVEL_OUTOF10: 0

## 2021-10-12 ASSESSMENT — PAIN DESCRIPTION - DESCRIPTORS: DESCRIPTORS: CRAMPING;SPASM

## 2021-10-12 ASSESSMENT — PAIN DESCRIPTION - PAIN TYPE
TYPE: CHRONIC PAIN
TYPE: CHRONIC PAIN
TYPE: ACUTE PAIN

## 2021-10-12 ASSESSMENT — PAIN DESCRIPTION - FREQUENCY: FREQUENCY: INTERMITTENT

## 2021-10-12 ASSESSMENT — PAIN DESCRIPTION - ORIENTATION
ORIENTATION: LEFT
ORIENTATION: LEFT

## 2021-10-12 NOTE — CONSULTS
[facility]  MD Yissel Escudero BSN, PA-C  ________________________________________  [facility]  Department of Radiation Oncology  [address] [place] [id number]  Office:  [phone]  Fax: [phone]    DATE:  07/19/2019    PATIENT:   Aram Nunez 1940                                   MEDICAL RECORD #:  [id number]                                                       REASON FOR CONSULTATION:  Aram Nunez is a very pleasant 79 y.o. male that has been referred to our clinic by Lisa Walton MD to discuss radiotherapy recommendations. He is here today in clinic with his family. Reports chest pain and SOB. Denies activity change, appetite change, fatigue, nausea/vomiting, diarrhea, light-headedness, weakness, and headaches. He follows .     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  Treated with systemic chemotherapy with completion of R CHOP on 4/15/19 and continued Revlimid under the direction of Dr. Lisa Walton. PET scan on 5/28/19 showed a faint uptake at sight of mass, otherwise complete response to chemotherapy.  · RECURRENCE: July 16, 2019, repeat CT chest showed 8.3 x 2.3 soft tissue mass involving the left second, third, fourth and fifth ribs in the left upper pleural chest wall region. Referred to radiation oncology, recommended referral to tertiary care center to explore clinical trial options.     12/24/2018 - He sees Dr. Mathis for primary care.  He presents to the emergency department today with a known mass on chest CT.  He saw his primary care physician last week and continues to complain of chest and back discomfort on the left side.  The initial thought a few weeks ago is that he had  Renal Consult  Full Note Dictated [id number]  A/P  1. ESRD  2. Hyperkalemia  3. Hyponatremia  4. Metabolic Acidosis  5. H/O Non-compliance    Urgent HD today and HD again tomorrow. High risk. Thank you. pulled a muscle. With continued complaints, he had a chest x-ray that was abnormal and was ultimately sent to [facility] [place] for a CT scan of his chest on 12/19. The patient was then set up to see Dr. Traylor in [place] on January 3rd.  However, the patient has continued to have discomfort and he and his family are anxious to undergo biopsy for definitive diagnosis and possible treatment. The thought by the emergency department today was that we could admit him for a CT guided needle biopsy today.  However, I discussed the case with Dr. De La Torre in radiology and he is unable to do this today given the holiday.  He does not have the appropriate technician for the support of pathology.  He would rather the patient be set up for an outpatient biopsy on Wednesday.    12/24/2018 - Chest x-ray:  • Approximately 10 x 4.3 cm pleural based left upper lobe mass. I do not see adjacent rib destruction but follow-up with CT imaging is recommended to more entirely exclude a neoplastic process.   • The lungs are otherwise clear.    12/24/2018 - CT angiogram chest with and without contrast:  • Left upper lobe pleural-based mass measures 7.6 x 4.1 x 9.4 cm, possibly infiltrating the chest wall, but does not appear to be eroding adjacent ribs.  • Enlarged left-sided mediastinal lymph node, concerning for katerina disease.    12/26/2018 - Left lung, fine-needle aspirate, core biopsies and smears:   · Large B cell malignant lymphoma, favor high grade.    01/14/2019 - PET Scan:   • There is a small hypermetabolic lymph node beneath the lateral left pectoralis minor with maximal SUV 5.3. This node measures 19 x 14 mm from 14 x 8 mm previously. This is located immediately adjacent to the left upper lobe dominant mass.  • There is low-grade metabolic activity (SUV less than 3) in the subcarinal region corresponding to a tiny lymph node. This is likely granulomatous related.  • There is FDG avidity of the recently biopsied peripheral  mass in the superior left upper lobe which slightly protrudes through the adjacent intercostal spaces. Ribs are enveloped by tumor but not destroyed. The mass measures approximately 9.6 x 4.7 cm with maximal SUV 9 and has slightly enlarged from December 24, 2018 when measured 8 x 3.7 cm.  • There is a bulky aorticopulmonary window lymph node measuring 4.3 x 2.9 cm with maximal SUV 6. On 12/24 this measured 3.5 x 2.8 cm. This node is centrally necrotic.  • There is hypermetabolic mid abdominal mesenteric mass to the right of midline. This measures 3 cm with a maximal SUV of 5.2.  • Focus of FDG uptake in the right pelvis is most likely physiologic excretion in the distal ureter.      01/18/2019 - 04/15/2019 - Chemotherapy course:  • R-CHOP chemotherapy   • Revlimid 15 mg by mouth daily     03/11/2019 - CT Chest with contrast:  • Virtually complete resolution of the large mass in the left lateral upper chest wall, only some residual pleural thickening is seen in this location, this is compatible with successful chemotherapy. No intrathoracic lymphadenopathy is identified.  • New left subclavian port catheter, good position with the tip at the SVC.  • Mild dilation of the central pulmonary arteries likely related to pulmonary arterial hypertension.  • Calcified atherosclerotic plaque within the coronary arteries greatest at the LAD distribution.    03/11/2019 - CT Abdomen/Pelvis:  • Interval resolution of the small mesenteric mass seen on previous PET/CT, compatible with successful chemotherapy.   • No intra-abdominal or pelvic lymphadenopathy is identified.  • Borderline splenomegaly.  • Marked enlargement of the prostate gland probably related to BPH. Correlation with PSA values is recommended. There is also bilateral thickening most likely related to chronic bladder outlet obstruction.    05/15/2019 - CT Angiogram chest with and without contrast:  • Moderate right and small left pleural effusions. Adjacent  atelectasis at the lower lobe bases.  • Pathologic left aorticopulmonary window lymph node has decreased in size from December 2018 but stable from March. No residual left upper lobe mass.  • New mildly enlarged right hilar lymph node, cannot differentiate reactive node from new site of disease.  • Small pericardial effusion.    05/28/2019 - PET Scan:  • Faint residual uptake in the region of the primary mass in the LEFT upper thorax. This is completely resolved, and the uptake in the intercostal muscles is most likely due to posttreatment change.  • No evidence of adjacent lymphadenopathy in the LEFT axilla.    07/16/2019 - CT Chest/Abdomen/Pelvis:  • Showed a 8.3 x 2.3 cm soft tissue mass involving left second, third, fourth, and fifth ribs in the left upper pleura chest wall region consistent with lymphoma recurrence.     07/16/2019 - Appointment with :  • Referred to radiation oncology for palliative radiation to left chest wall recurrance  • Start norco for chest wall pain   • Follow up in 2 weeks     History obtained from  PATIENT, FAMILY and CHART    PAST MEDICAL HISTORY   Past Medical History:   Diagnosis Date   • Arthritis    • Cancer (CMS/HCC)     skin   • Diabetes mellitus (CMS/HCC)     borderline, no medication   • Disease of thyroid gland    • Dysrhythmia    • HL (hearing loss)    • Hyperlipidemia    • Hypertension    • IBS (irritable bowel syndrome)    • Lung mass 12/24/2018   • Neuropathy    • Non Hodgkin's lymphoma (CMS/HCC)       PAST SURGICAL HISTORY   Past Surgical History:   Procedure Laterality Date   • CARDIAC CATHETERIZATION     • PARATHYROID GLAND SURGERY     • PROSTATE SURGERY      PROSTATECTOMY   • SKIN CANCER EXCISION        FAMILY HISTORY  family history includes Cancer in his father; Heart disease in his brother and mother.     SOCIAL HISTORY   Social History     Tobacco Use   • Smoking status: Never Smoker   • Smokeless tobacco: Never Used   Substance Use Topics   • Alcohol  use: No   • Drug use: No     ALLERGIES  Patient has no known allergies.     MEDICATIONS   Current Outpatient Medications   Medication Sig Dispense Refill   • apixaban (ELIQUIS) 5 MG tablet tablet Take 1 tablet by mouth Every 12 (Twelve) Hours. 60 tablet 2   • B Complex Vitamins (VITAMIN B COMPLEX) capsule capsule Take 1 capsule by mouth Daily.     • digoxin (LANOXIN) 125 MCG tablet Take 1 tablet by mouth Daily. 30 tablet 2   • finasteride (PROSCAR) 5 MG tablet Take 5 mg by mouth Daily.     • furosemide (LASIX) 20 MG tablet Take 1 tablet by mouth Daily. 30 tablet 11   • Lidocaine-Prilocaine, Bulk, 2.5-2.5 % cream Apply 1 application topically to the appropriate area as directed As Needed (APPLY TO AREA OF PORT 30 MIN- 1 HR PRIOR TO PORT ACCESS).     • loratadine (CLARITIN) 10 MG tablet Take 10 mg by mouth Daily.     • megestrol (MEGACE) 40 MG/ML suspension Take 400 mg by mouth Daily.     • melatonin 5 MG tablet tablet Take 5 mg by mouth Every Night.     • metFORMIN (GLUCOPHAGE) 1000 MG tablet Take 1,000 mg by mouth 2 (Two) Times a Day With Meals.     • metoprolol tartrate (LOPRESSOR) 25 MG tablet Take 0.5 tablets by mouth Every 12 (Twelve) Hours. 60 tablet 2   • pantoprazole (PROTONIX) 40 MG EC tablet Take 40 mg by mouth 2 (Two) Times a Day.     • polyethylene glycol (MIRALAX) packet Take 17 g by mouth Daily As Needed (constipation).     • potassium chloride (K-DUR,KLOR-CON) 20 MEQ CR tablet Take 1 tablet by mouth Daily. 30 tablet 11   • traZODone (DESYREL) 50 MG tablet Take 50 mg by mouth Every Night.       No current facility-administered medications for this visit.       The following portions of the patient's history were reviewed and updated as appropriate: allergies, current medications, past family history, past medical history, past social history, past surgical history and problem list.    REVIEW OF SYSTEMS  Review of Systems  .  PHYSICAL EXAM  VITAL SIGNS:   Vitals:    07/19/19 1542   BP: 111/84   Weight:  68.5 kg (151 lb)   PainSc: 8  Comment: without meds, 2 with meds   PainLoc: Chest      General:  Alert and oriented, no acute distress, thin build, Vitals reviewed.  Head/Neck:  Normocephalic, without obvious abnormality, atraumatic.  The trachea is midline.   Nose/Sinuses:  Nares normal. Septum midline. Mucosa normal. No drainage or sinus tenderness.  Mouth/Throat:  Mucosa moist, no lesions; pharynx without erythema, edema or exudate.  Neck:  supple, no mass, non-tender  Eyes: No gross abnormalities,  no scleral jaundice or erythema.    Ears: Ears appear intact with no abnormalities noted, hearing grossly normal  Chest:  Respiratory efforts are normal and unlabored, chest is clear to auscultation. There are no wheezes, rhonchi, rales.  Cardiovascular: Regular rate and rhythm without murmurs, rubs, or gallops.   Abdomen:  Soft, non-tender, normal bowel sounds; no noted organomegaly or masses. no CVA tenderness   Extremities:  SOARES well, warm to touch, no evidence of cyanosis or edema.  Lymphatics:  No evidence of adenopathy in the cervical or supraclavicular areas.  Skin: No suspicious lesions or rashes of concern, skin color, texture, turgor are normal  Neurologic:  Alert and oriented, gait normal, strength and sensation grossly normal  Psych: Mood and affect are appropriate for circumstance. Eye contact is appropriate.     Performance Status: ECOG (0) Fully active, able to carry on all predisease performance without restriction    Clinical Quality Measures  -Pain Documented by Standardized Tool, FPS  Aram Nunez reports a pain score of 8 with no meds, 2 with medications.  Given his pain assessment as noted, treatment options were discussed and the following options were decided upon as a follow-up plan to address the patient's pain: prescription for opiod analgesics.(this is prescribed by another MD). Patient is to return for treatments next week and we will reassess his pain weekly.  -Advanced Care Planning  Care plan discussed, no care plan provided  -Body Mass Index Screening and Follow-Up Plan Patient's Body mass index is 19.39 kg/m².   -Tobacco Use: Screening and Cessation Intervention Social History    Tobacco Use      Smoking status: Never Smoker      Smokeless tobacco: Never Used    ASSESSMENT AND PLAN   1. Diffuse large B-cell lymphoma, unspecified body region (CMS/HCC)    2. Cancer related pain    3. Status post chemotherapy    4. Non-smoker    5. Encounter for consultation         RECOMMENDATIONS: Aram Nunez has been referred to our office today to discuss radiotherapy recommendations for recurrent Stage IV large B cell Lymphoma of the left chest wall.    Diagnosed in December 2018 with stage IV diffuse high grade large B-cell lymphoma, left upper lobe pleural-based 7.6 x 4.1 x 9.4 cm mass with left AP window mediastinal adenopathy, 4.3 cm in mid abdomen mesenteric mass, 3 cm.  Treated with systemic chemotherapy with completion of R CHOP on 4/15/19 and continued Revlimid under the direction of Dr. Lisa Walton. PET scan on 5/28/19 showed a faint uptake at sight of mass, otherwise complete response to chemotherapy.  RECURRENCE: July 16, 2019, repeat CT chest showed 8.3 x 2.3 soft tissue mass involving the left second, third, fourth and fifth ribs in the left upper pleural chest wall region. Referred to radiation oncology, recommended referral to tertiary care center to explore clinical trial options.     We have discussed the indications and rationale of radiation therapy according to the NCCN Guidelines. I have extensively reviewed the risks, benefits and alternatives of therapy and progression of disease in spite of therapy with either local or systemic failure.  I have seen, examined and reviewed this patient's medication list, appropriate labs and imaging studies.  I reviewed relevant medical records and other physicians notes.  I discussed the goals and plans of care with the patient and  family and answered all questions.     After careful consideration of the available clinical diagnostic data and extensive examination and evaluation of the patient, it is my recommendation that this patient be treated with radiation therapy for pain and tumor control,  8580-0263 cGy, final course to be determined with planning. The patient verbalizes understanding of this discussion, voices no further questions and wishes to proceed with recommended therapy.      Mr. Nunez reports a pain score of 8 with no meds, 2 with medications.  Given his pain assessment as noted, treatment options were discussed and the following options were decided upon as a follow-up plan to address the patient's pain: prescription for opiod analgesics.(this is prescribed by another MD). Patient is to return for treatments next week and we will reassess his pain weekly.  We will perform CT simulation today to initiate the treatment planning to begin treatments ASAP.    Todays appointment time was spent in counseling and coordination of care as follows: diagnosis, intent of treatment discussing radiation therapy specifics: logistics, possible and probable side effects and after effects, staging of cancer, standard of care in for this stage of this cancer and treatment options  Joel Vang III, MD   07/19/2019

## 2021-10-12 NOTE — ED PROVIDER NOTES
[address] PROVIDER NOTE    Patient Identification  Pt Name: Serina Martino  MRN: [id number]  Fredy 1975  Date of evaluation: 10/12/2021  Provider: Jesu Dawson MD  PCP: No primary care provider on file. Chief Complaint  Other (Pt states her diaylsis clinic told her to come to the hospital due to elevated potassium levels. Pt was advised early in the afternoon yesterday, pt states she came during the evening but the hospital was \" busy so my boyfriend needed sleep so we left\". Pt returned after \"everyone got some rest\" Denies CP at this time. )      HPI  (History provided by patient)  This is a 55 y.o. female who was brought in by self for hyperkalemia. Patient states she has not had dialysis since Wednesday. She is on chronic dialysis for chronic renal failure. Her nephrologist is Dr. Merlinda Dimmer. She was called yesterday about recent outpatient labs. Her potassium was found to be 6.4, so she was told to come to the emergency department for emergent treatment. Despite this, patient denies having any symptoms at this time. Specifically, she has not had chest pain, palpitations, sense of heart racing, shortness of breath, lightheadedness, numbness, tingling, weakness, or other symptoms. Chris Carrizales ROS  10 systems reviewed, pertinent positives/negatives per HPI otherwise noted to be negative. I have reviewed the following nursing documentation:  Allergies: Codeine and Iodides    Past medical history:   Past Medical History:   Diagnosis Date    Brain aneurysm     Polycystic kidney disease     Unspecified cerebral artery occlusion with cerebral infarction      Past surgical history:   Past Surgical History:   Procedure Laterality Date    EYE SURGERY         Home medications:   Previous Medications    AMPHETAMINE-DEXTROAMPHETAMINE (ADDERALL, 20MG,) 20 MG TABLET    Take 20 mg by mouth daily.     ASPIRIN 81 MG TABLET    Take 81 mg by mouth daily     METOPROLOL TARTRATE (LOPRESSOR) 50 MG TABLET    Take 50 mg by mouth 2 times daily     MULTIPLE VITAMINS-MINERALS (THERAPEUTIC MULTIVITAMIN-MINERALS) TABLET    Take 1 tablet by mouth daily. OMEPRAZOLE (PRILOSEC) 40 MG CAPSULE    Take 1 capsule by mouth daily    ONDANSETRON (ZOFRAN ODT) 4 MG DISINTEGRATING TABLET    Take 1 tablet by mouth every 8 hours as needed for Nausea    POTASSIUM CHLORIDE (KLOR-CON M) 20 MEQ EXTENDED RELEASE TABLET    Take 1 tablet by mouth daily for 7 days       Social history:  reports that she has been smoking cigarettes. She has been smoking about 0.50 packs per day. She has never used smokeless tobacco. She reports current drug use. Drug: Marijuana. She reports that she does not drink alcohol. Family history:    Family History   Problem Relation Age of Onset    Kidney Disease Mother         PKD    Kidney Disease Sister         PKD       Exam  ED Triage Vitals [10/12/21 0701]   BP Temp Temp Source Pulse Resp SpO2 Height Weight   (!) 181/108 98.7 °F (37.1 °C) Oral 93 20 100 % -- --     Nursing note and vitals reviewed. Constitutional: Well developed, well nourished. Non-toxic in appearance. HENT:      Head: Normocephalic and atraumatic. Ears: External ears normal.      Nose: Nose normal.     Mouth: Membrane mucosa moist and pink. Eyes: Anicteric sclera. No discharge. Neck: Supple. Trachea midline. Cardiovascular: RRR; no murmurs, rubs, or gallops. Pulmonary/Chest: Effort normal. No respiratory distress. CTAB. No stridor. No wheezes. No rales. Abdominal: Soft. No distension. Non-tender to palpation. Musculoskeletal: Moves all extremities. No gross deformity. Neurological: Alert and oriented. Face symmetric. Speech is clear. Skin: Warm and dry. No rash. Psychiatric: Normal mood and affect. Behavior is normal.    EKG  The Ekg interpreted by me in the absence of a cardiologist shows.   normal sinus rhythm with a rate of 89  Axis is   Left axis deviation  QTc is  normal  LVH  Peaked T-waves in the anterior leads, consistent with findings due to hyperkalemia  P Q waves are new in comparison to previous EKG from May 17, 2019    Labs  Results for orders placed or performed during the hospital encounter of 10/12/21   CBC Auto Differential   Result Value Ref Range    WBC 8.6 4.0 - 11.0 K/uL    RBC 4.56 4.00 - 5.20 M/uL    Hemoglobin 13.5 12.0 - 16.0 g/dL    Hematocrit 41.0 36.0 - 48.0 %    MCV 89.9 80.0 - 100.0 fL    MCH 29.7 26.0 - 34.0 pg    MCHC 33.0 31.0 - 36.0 g/dL    RDW 15.5 (H) 12.4 - 15.4 %    Platelets 423 522 - 149 K/uL    MPV 10.1 5.0 - 10.5 fL    Neutrophils % 75.6 %    Lymphocytes % 14.9 %    Monocytes % 6.9 %    Eosinophils % 1.6 %    Basophils % 1.0 %    Neutrophils Absolute 6.5 1.7 - 7.7 K/uL    Lymphocytes Absolute 1.3 1.0 - 5.1 K/uL    Monocytes Absolute 0.6 0.0 - 1.3 K/uL    Eosinophils Absolute 0.1 0.0 - 0.6 K/uL    Basophils Absolute 0.1 0.0 - 0.2 K/uL   Comprehensive Metabolic Panel w/ Reflex to MG   Result Value Ref Range    Sodium 131 (L) 136 - 145 mmol/L    Potassium reflex Magnesium 7.0 (HH) 3.5 - 5.1 mmol/L    Chloride 91 (L) 99 - 110 mmol/L    CO2 12 (LL) 21 - 32 mmol/L    Anion Gap 28 (H) 3 - 16    Glucose 76 70 - 99 mg/dL     (HH) 7 - 20 mg/dL    CREATININE 16.4 (HH) 0.6 - 1.1 mg/dL    GFR Non-African American 2 (A) >60    GFR  3 (A) >60    Calcium 9.7 8.3 - 10.6 mg/dL    Total Protein 8.1 6.4 - 8.2 g/dL    Albumin 4.9 3.4 - 5.0 g/dL    Albumin/Globulin Ratio 1.5 1.1 - 2.2    Total Bilirubin 0.3 0.0 - 1.0 mg/dL    Alkaline Phosphatase 167 (H) 40 - 129 U/L    ALT 9 (L) 10 - 40 U/L    AST 11 (L) 15 - 37 U/L    Globulin 3.2 Not Established g/dL   Urine, reflex to culture    Specimen: Urine, clean catch   Result Value Ref Range    Color, UA YELLOW Straw/Yellow    Clarity, UA CLOUDY (A) Clear    Glucose, Ur 100 (A) Negative mg/dL    Bilirubin Urine Negative Negative    Ketones, Urine Negative Negative mg/dL    Specific Gravity, UA 1.010 1.005 - 1.030 Blood, Urine SMALL (A) Negative    pH, UA 7.5 5.0 - 8.0    Protein, UA >=300 (A) Negative mg/dL    Urobilinogen, Urine 0.2 <2.0 E.U./dL    Nitrite, Urine Negative Negative    Leukocyte Esterase, Urine MODERATE (A) Negative    Microscopic Examination YES     Urine Type NotGiven     Urine Reflex to Culture Yes    Microscopic Urinalysis   Result Value Ref Range    Bacteria, UA 1+ (A) None Seen /HPF    Hyaline Casts, UA 1 0 - 8 /LPF    WBC, UA 89 (H) 0 - 5 /HPF    RBC, UA 8 (H) 0 - 4 /HPF    Epithelial Cells, UA 3 0 - 5 /HPF     MDM and ED Course  Potassium here is 7.0. Cr is 16.4. EKG shows peaked T-waves consistent with hyperkalemia. I consulted Dr. Aydee Morin (nephrologist is Trudi) who plans to evaluate her and set up dialysis. I treated hyperkalemia with albuterol, insulin/glucose, calcium gluconate, and lokelma. Patient remained stable throughout her visit and remained asymptomatic    I consulted Dr. Steve Ryan through [employer] for admission. She reviewed the patient's history, physical exam, labs, imaging studies, and emergency department course and has decided to admit Sonia London for further evaluation and treatment. As I have deemed necessary from their history, physical, and studies, I have considered and evaluated Sonia London for the following diagnoses: Malignant dysrhythmia, ACS, acute on chronic renal failure, urinary tract infection, and other emergent or related diagnoses    The total Critical Care time is 20 minutes which excludes separately billable procedures. Final Impression  1. Acute hyperkalemia    2. Acute renal failure superimposed on chronic kidney disease, unspecified CKD stage, unspecified acute renal failure type (HCC)        Blood pressure (!) 163/114, pulse 85, temperature 98.7 °F (37.1 °C), temperature source Oral, resp. rate 20, SpO2 99 %. Disposition:  DISPOSITION Decision To Admit 10/12/2021 09:34:57 AM      This chart was generated using the [address] [place] [address]  [employer] system.  I created this record but it may contain dictation errors given the limitations of this technology.         Myra Pate MD  10/12/21 2464

## 2021-10-12 NOTE — CONSULTS
[facility]                     350 [facility], [address]                                  CONSULTATION    PATIENT NAME: Eufemia Duenas                       :        1975  MED REC NO:   [id number]                          ROOM:       0010  ACCOUNT NO:   [de-identified]                           ADMIT DATE: 10/12/2021  PROVIDER:     Shauna Jeans, MD    RENAL CONSULTATION    CONSULT DATE:  10/12/2021    CONSULTING PHYSICIAN:  Shauna Jeans, MD    REFERRING PROVIDER:  Sharon Acevedo MD    REASON FOR CONSULTATION:  ESRD management, severe hyperkalemia,  hyponatremia, metabolic acidosis, noncompliance. HISTORY OF PRESENT ILLNESS:  The patient is a 80-year-old female with  history of ESRD, has left femoral tunneled dialysis catheter as an  access, hyperkalemia, metabolic acidosis, noncompliance, homelessness,  who presents to the hospital today after being asked to come to the  hospital because of elevated serum potassium. According to her, her  last dialysis was Wednesday. She has been very noncompliant with her  treatment despite multiple discussions. Today, her potassium is 7 with  a serum bicarbonate of 12 and BUN of 123 and a creatinine of 16. Blood  pressure is elevated. She has no other complaints. She still has some  urinary output, but has been decreasing. PAST MEDICAL HISTORY:  Includes ESRD, homelessness, polycystic kidney  disease, brain aneurysm, CVA. ALLERGIES:  Includes CODEINE and IODIDES. MEDICATIONS PRIOR TO ADMISSION:  Includes ondansetron, omeprazole,  aspirin, multivitamins, Adderall, metoprolol, question potassium  chloride. SOCIAL HISTORY:  Homeless, very noncompliant with her hemodialysis  treatment. History of smoking. Uses marijuana. Denies any alcohol  abuse. FAMILY HISTORY:  Includes polycystic kidney disease. REVIEW OF SYSTEMS:  GENERAL:  Positive malaise. SKIN:  No skin rash, itching or discharge.   NEUROLOGIC: No headaches. No focal deficits. CARDIOVASCULAR:  No chest pain or palpitations. PULMONARY:  No shortness of breath. No coughing or hemoptysis. GI:  No abdominal pain. No nausea, no vomiting, no diarrhea or  constipation. RENAL:  Denies any gross hematuria. On hemodialysis, has some residual  kidney function which is decreasing. ENDOCRINE:  No history of diabetes. No heat or cold intolerance. ID:  No fever or chills. MUSCULOSKELETAL:  Denies active joint pain. PHYSICAL EXAMINATION:  VITAL SIGNS:  Blood pressure 163/114, pulse 85, respirations 20,  temperature 98.7, saturating 99%. Weight is not recorded. GENERAL:  Appears tired. HEENT:  Anicteric sclerae. Clear conjunctivae. SKIN:  Anicteric. No rash. CHEST:  Clear. HEART:  Regular. ABDOMEN:  Soft, nontender. No rebound or involuntary guarding. EXTREMITIES:  Shows trace edema. She has a left femoral tunneled  dialysis catheter. LABORATORY DATA:  Sodium 131, potassium 7, chloride 91, bicarb 12, BUN  123, creatinine 16.4, glucose 76, calcium 9.7. Albumin 4.9. WBC 8.6,  hemoglobin 13.5, hematocrit 41, platelet count 417,118. Urinalysis,  specific gravity 1.010, pH 7.5, blood small, protein is greater than  300, leukocyte esterase moderate, wbc 89. Urine culture is pending. ASSESSMENT AND PLAN:  1. ESRD. Outpatient hemodialysis Mondays, Wednesdays and Fridays at  [facility]. Very noncompliant with treatments. Hemodialysis today on a shorter time and lower blood flow to avoid  dialysis disequilibrium, but would need to address significant  hyperkalemia. Would use 1K bath. She received medical therapy for  hyperkalemia in the ER. 2.  Hyponatremia 1 liter per 24 hour fluid restriction. 3.  Metabolic acidosis. Positive anion gap. Follow with hemodialysis. 4.  Hypertension. Fluid removal during dialysis. 5.  Hemoglobin is acceptable. 6.  History of polycystic kidney disease. 7.  Pyuria. Follow urine culture. Since the patient is currently ESRD. The patient is high risk and would need close followup. Has had  multiple discussions about the importance of compliance. Thank you for  this consult.         Matilde Guevara MD    D: 10/12/2021 11:16:46       T: 10/12/2021 11:19:04     [email or web address]  Job#: [id number]     Doc#: [id number]    CC:

## 2021-10-12 NOTE — FLOWSHEET NOTE
10/12/21 1300 10/12/21 1548   Vital Signs   BP (!) 153/94 (!) 128/92   Temp 95.1 °F (35.1 °C) 97.2 °F (36.2 °C)   Pulse 86 93   Resp 20 20     Treatment time: 3 hours ordered. System clotted with 29 minutes RTD, actual tx time 2:25. Tx stopped per Dr. Caryn Gould order. Net UF: 1528 ml    Pre weight: on stretcher without scale, too obtuned to stand to weigh. Post weight: on stretcher without scale, too obtuned to stand to weigh. EDW: TBD    Access used: Left femoral tunneled HD cath  Access function: Poor flow, numerous high AP alarms, would not keep left leg straight. Arrived to [place] without drsg to catheter site, area filthy with black sticky residue on lumens/catheter. Medications or blood products given: Cathflo to dwell. Regular outpatient schedule: [address] [facility]    Summary of response to treatment: Hypotension last hour of tx, SBP 75, 200 ml NS given, improved BP to 110's. Copy of dialysis treatment record placed in chart, to be scanned into EMR. Report called to Verenice Willoughby RN.

## 2021-10-12 NOTE — H&P
current facility-administered medications on file prior to encounter. Current Outpatient Medications on File Prior to Encounter   Medication Sig Dispense Refill    Calcium Acetate, Phos Binder, (PHOSLO) 667 MG CAPS Take 667 mg by mouth 3 times daily (with meals)      metoprolol tartrate (LOPRESSOR) 25 MG tablet Take 25 mg by mouth 2 times daily          Allergies: Allergies   Allergen Reactions    Codeine     Iodides      Pt states she has kidney damage and it's not good for you         Social History:   reports that she has been smoking cigarettes. She has been smoking about 0.50 packs per day. She has never used smokeless tobacco. She reports current drug use. Drug: Marijuana. She reports that she does not drink alcohol. Family History:  family history includes Kidney Disease in her mother and sister. Physical Exam:  BP (!) 146/90   Pulse 103   Temp 98.7 °F (37.1 °C) (Oral)   Resp 20   SpO2 99%     General appearance: No apparent distress appears stated age and cooperative. HEENT Normal cephalic, atraumatic without obvious deformity. Pupils equal, round, and reactive to light. Extra ocular muscles intact. Conjunctivae/corneas clear. Neck: Supple, No jugular venous distention/bruits. Trachea midline without thyromegaly or adenopathy with full range of motion. Lungs: Clear to auscultation, bilaterally without Rales/Wheezes/Rhonchi with good respiratory effort. Heart: Regular rate and rhythm with Normal S1/S2 without murmurs, rubs or gallops, point of maximum impulse non-displaced  Abdomen: Soft, non-tender or non-distended without rigidity or guarding and positive bowel sounds all four quadrants. Extremities: No clubbing, cyanosis, or edema bilaterally. Full range of motion without deformity and normal gait intact. Skin: Skin color, texture, turgor normal.  No rashes or lesions.   Neurologic: Alert and oriented X 3, neurovascularly intact with sensory/motor intact upper extremities/lower extremities, bilaterally. Cranial nerves: II-XII intact, grossly non-focal.  Psychiatry: Appropriate affect. Not agitated  Skin: Warm, dry, normal turgor, no rash  Brisk capillary refill, peripheral pulses palpable     Labs:  CBC:   Lab Results   Component Value Date    WBC 8.6 10/12/2021    RBC 4.56 10/12/2021    HGB 13.5 10/12/2021    HCT 41.0 10/12/2021    MCV 89.9 10/12/2021    MCH 29.7 10/12/2021    MCHC 33.0 10/12/2021    RDW 15.5 10/12/2021     10/12/2021    MPV 10.1 10/12/2021     BMP:    Lab Results   Component Value Date     10/12/2021    K 7.0 10/12/2021    CL 91 10/12/2021    CO2 12 10/12/2021     10/12/2021    CREATININE 16.4 10/12/2021    CALCIUM 9.7 10/12/2021    GFRAA 3 10/12/2021    LABGLOM 2 10/12/2021    GLUCOSE 76 10/12/2021     No orders to display       Discussed case  with ED physician    Problem List  Active Problems:    Hyperkalemia  Resolved Problems:    * No resolved hospital problems. *        Assessment/Plan:     Hyperkalemia: Potassium elevated to 7 in the ED  EKG with peaked T waves; status post hyperkalemia cocktail in the ED  Nephrology on board; schedule for emergent HD  Repeat potassium level ordered  Monitor on telemetry and monitor potassium closely    History of ESRD on hemodialysis Monday Wednesday Friday  Noncompliant with HD; reports that she did not HD since Wednesday  Nephrology on board; scheduled for emergent HD    Hyponatremia: Serum sodium 131 on admission  Continue 1 L per 24-hour fluid restriction    Anion gap metabolic acidosis:  In the setting of ESRD  Started on HD; follow-up labs    Hx of hypertension: Continue home dose of metoprolol    Asymptomatic pyuria: Urine suggestive of an infection  Patient completely asymptomatic  Urine culture ordered  We will hold off on any antibiotics for now as patient is afebrile and completely symptomatic      DVT prophylaxis: s/c heparin   Code status: Full  Diet: Renal    Admit as inpatient I anticipate hospitalization spanning less than two midnights for investigation and treatment of the above medically necessary diagnoses. Please note that some part of this chart was generated using Dragon dictation software. Although every effort was made to ensure the accuracy of this automated transcription, some errors in transcription may have occurred inadvertently. If you may need any clarification, please do not hesitate to contact me through [facility]        Rinku Taylor MD    10/12/2021 2:27 PM

## 2021-10-12 NOTE — PROGRESS NOTES
Pt checked into room, tele placed, vitals stable. Will give report to main ARACELY Posadas Course. Gave pt diet order.

## 2021-10-12 NOTE — ED PROVIDER NOTES
EKG reviewed by myself  Dated today, 2329  Rate 89, NSR, Peaked T's. New from 5/17/2019.      Chloe Jalloh MD  10/11/21 7824

## 2021-10-12 NOTE — ED NOTES
Pharmacy Medication History Note      List of current medications patient is taking is incomplete- see notes below. Source of information: Patient, Samira BATEMAN, Asael Swartz records    Changes made to medication list:  Medications flagged for removal (include reason, ex. noncompliance):  none    Medications removed (include reason, ex. therapy complete or physician discontinued): All medications- old entries    Medications added/doses adjusted:  Metoprolol adjusted to 25 mg BID  Phoslo 667 mg TID     Other notes (ex. Recent course of antibiotics, Coumadin dosing):  Patient refuses to answer any questions regarding her medication history, including where she fills medications. She states she is no longer taking any blood thinners. No recent pharmacy fill history has been found. [place] reviewed, no controlled substances have been filled for her since 2019. Medication non-adherence is highly suspected. Denies use of other OTC or herbal medications. Last dose times updated. Lalita Welch, PharmD  ED Pharmacist [id number]  10/12/2021    No current facility-administered medications on file prior to encounter.      Current Outpatient Medications on File Prior to Encounter   Medication Sig Dispense Refill    Calcium Acetate, Phos Binder, (PHOSLO) 667 MG CAPS Take 667 mg by mouth 3 times daily (with meals)      metoprolol tartrate (LOPRESSOR) 25 MG tablet Take 25 mg by mouth 2 times daily       [DISCONTINUED] potassium chloride (KLOR-CON M) 20 MEQ extended release tablet Take 1 tablet by mouth daily for 7 days 30 tablet 0    [DISCONTINUED] ondansetron (ZOFRAN ODT) 4 MG disintegrating tablet Take 1 tablet by mouth every 8 hours as needed for Nausea (Patient not taking: Reported on 3/16/2020) 20 tablet 0    [DISCONTINUED] omeprazole (PRILOSEC) 40 MG capsule Take 1 capsule by mouth daily (Patient not taking: Reported on 3/16/2020) 30 capsule 0    [DISCONTINUED] aspirin 81 MG tablet Take 81 mg by mouth daily [DISCONTINUED] Multiple Vitamins-Minerals (THERAPEUTIC MULTIVITAMIN-MINERALS) tablet Take 1 tablet by mouth daily. [DISCONTINUED] amphetamine-dextroamphetamine (ADDERALL, 20MG,) 20 MG tablet Take 20 mg by mouth daily.

## 2021-10-13 LAB
A/G RATIO: 1.2 (ref 1.1–2.2)
ALBUMIN SERPL-MCNC: 4.3 G/DL (ref 3.4–5)
ALP BLD-CCNC: 159 U/L (ref 40–129)
ALT SERPL-CCNC: 8 U/L (ref 10–40)
ANION GAP SERPL CALCULATED.3IONS-SCNC: 22 MMOL/L (ref 3–16)
AST SERPL-CCNC: 13 U/L (ref 15–37)
BASOPHILS ABSOLUTE: 0 K/UL (ref 0–0.2)
BASOPHILS RELATIVE PERCENT: 0.5 %
BILIRUB SERPL-MCNC: 0.4 MG/DL (ref 0–1)
BUN BLDV-MCNC: 78 MG/DL (ref 7–20)
CALCIUM SERPL-MCNC: 10 MG/DL (ref 8.3–10.6)
CHLORIDE BLD-SCNC: 92 MMOL/L (ref 99–110)
CO2: 20 MMOL/L (ref 21–32)
CREAT SERPL-MCNC: 12.4 MG/DL (ref 0.6–1.1)
EKG ATRIAL RATE: 94 BPM
EKG DIAGNOSIS: NORMAL
EKG P AXIS: 62 DEGREES
EKG P-R INTERVAL: 134 MS
EKG Q-T INTERVAL: 344 MS
EKG QRS DURATION: 72 MS
EKG QTC CALCULATION (BAZETT): 430 MS
EKG R AXIS: 22 DEGREES
EKG T AXIS: 60 DEGREES
EKG VENTRICULAR RATE: 94 BPM
EOSINOPHILS ABSOLUTE: 0 K/UL (ref 0–0.6)
EOSINOPHILS RELATIVE PERCENT: 0.4 %
GFR AFRICAN AMERICAN: 4
GFR NON-AFRICAN AMERICAN: 3
GLOBULIN: 3.7 G/DL
GLUCOSE BLD-MCNC: 95 MG/DL (ref 70–99)
HCT VFR BLD CALC: 40.4 % (ref 36–48)
HEMOGLOBIN: 13.4 G/DL (ref 12–16)
LYMPHOCYTES ABSOLUTE: 1.5 K/UL (ref 1–5.1)
LYMPHOCYTES RELATIVE PERCENT: 18.8 %
MAGNESIUM: 2.6 MG/DL (ref 1.8–2.4)
MCH RBC QN AUTO: 29.1 PG (ref 26–34)
MCHC RBC AUTO-ENTMCNC: 33.3 G/DL (ref 31–36)
MCV RBC AUTO: 87.4 FL (ref 80–100)
MONOCYTES ABSOLUTE: 0.7 K/UL (ref 0–1.3)
MONOCYTES RELATIVE PERCENT: 8.8 %
NEUTROPHILS ABSOLUTE: 5.7 K/UL (ref 1.7–7.7)
NEUTROPHILS RELATIVE PERCENT: 71.5 %
PDW BLD-RTO: 15.6 % (ref 12.4–15.4)
PLATELET # BLD: 235 K/UL (ref 135–450)
PMV BLD AUTO: 9.6 FL (ref 5–10.5)
POTASSIUM SERPL-SCNC: 5.5 MMOL/L (ref 3.5–5.1)
RBC # BLD: 4.62 M/UL (ref 4–5.2)
SODIUM BLD-SCNC: 134 MMOL/L (ref 136–145)
TOTAL PROTEIN: 8 G/DL (ref 6.4–8.2)
URINE CULTURE, ROUTINE: NORMAL
WBC # BLD: 8 K/UL (ref 4–11)

## 2021-10-13 PROCEDURE — 93005 ELECTROCARDIOGRAM TRACING: CPT | Performed by: INTERNAL MEDICINE

## 2021-10-13 PROCEDURE — 2580000003 HC RX 258: Performed by: INTERNAL MEDICINE

## 2021-10-13 PROCEDURE — 2500000003 HC RX 250 WO HCPCS: Performed by: INTERNAL MEDICINE

## 2021-10-13 PROCEDURE — 36415 COLL VENOUS BLD VENIPUNCTURE: CPT

## 2021-10-13 PROCEDURE — 6370000000 HC RX 637 (ALT 250 FOR IP): Performed by: INTERNAL MEDICINE

## 2021-10-13 PROCEDURE — 93010 ELECTROCARDIOGRAM REPORT: CPT | Performed by: INTERNAL MEDICINE

## 2021-10-13 PROCEDURE — 90935 HEMODIALYSIS ONE EVALUATION: CPT

## 2021-10-13 PROCEDURE — 6370000000 HC RX 637 (ALT 250 FOR IP): Performed by: NURSE PRACTITIONER

## 2021-10-13 PROCEDURE — 6360000002 HC RX W HCPCS: Performed by: INTERNAL MEDICINE

## 2021-10-13 PROCEDURE — 83735 ASSAY OF MAGNESIUM: CPT

## 2021-10-13 PROCEDURE — 80053 COMPREHEN METABOLIC PANEL: CPT

## 2021-10-13 PROCEDURE — 1200000000 HC SEMI PRIVATE

## 2021-10-13 PROCEDURE — 85025 COMPLETE CBC W/AUTO DIFF WBC: CPT

## 2021-10-13 RX ADMIN — METOPROLOL TARTRATE 25 MG: 25 TABLET, FILM COATED ORAL at 13:00

## 2021-10-13 RX ADMIN — CYCLOBENZAPRINE 10 MG: 10 TABLET, FILM COATED ORAL at 13:00

## 2021-10-13 RX ADMIN — CALCIUM ACETATE 667 MG: 667 CAPSULE ORAL at 16:09

## 2021-10-13 RX ADMIN — HEPARIN SODIUM 5000 UNITS: 5000 INJECTION INTRAVENOUS; SUBCUTANEOUS at 05:46

## 2021-10-13 RX ADMIN — ACETAMINOPHEN 650 MG: 325 TABLET ORAL at 20:16

## 2021-10-13 RX ADMIN — HEPARIN SODIUM 5000 UNITS: 5000 INJECTION INTRAVENOUS; SUBCUTANEOUS at 13:01

## 2021-10-13 RX ADMIN — ALTEPLASE 2 MG: 2.2 INJECTION, POWDER, LYOPHILIZED, FOR SOLUTION INTRAVENOUS at 12:23

## 2021-10-13 RX ADMIN — CALCIUM ACETATE 667 MG: 667 CAPSULE ORAL at 13:00

## 2021-10-13 RX ADMIN — METOPROLOL TARTRATE 25 MG: 25 TABLET, FILM COATED ORAL at 20:16

## 2021-10-13 RX ADMIN — HEPARIN SODIUM 5000 UNITS: 5000 INJECTION INTRAVENOUS; SUBCUTANEOUS at 20:16

## 2021-10-13 RX ADMIN — Medication 10 ML: at 20:18

## 2021-10-13 RX ADMIN — Medication 10 ML: at 13:02

## 2021-10-13 RX ADMIN — CYCLOBENZAPRINE 10 MG: 10 TABLET, FILM COATED ORAL at 16:09

## 2021-10-13 ASSESSMENT — ENCOUNTER SYMPTOMS
ABDOMINAL DISTENTION: 0
BLOOD IN STOOL: 0
CHEST TIGHTNESS: 0
SHORTNESS OF BREATH: 0
EYE REDNESS: 0
COUGH: 0
PHOTOPHOBIA: 0
EYE DISCHARGE: 0
DIARRHEA: 0
NAUSEA: 0
VOMITING: 0
ABDOMINAL PAIN: 0
CONSTIPATION: 0
FACIAL SWELLING: 0

## 2021-10-13 ASSESSMENT — PAIN SCALES - GENERAL
PAINLEVEL_OUTOF10: 5
PAINLEVEL_OUTOF10: 0
PAINLEVEL_OUTOF10: 0
PAINLEVEL_OUTOF10: 5
PAINLEVEL_OUTOF10: 3

## 2021-10-13 NOTE — CARE COORDINATION
Discharge Planning Assessment  Discharge Planning Assessment  RN/SW discharge planner met with patient/ (and family member) to discuss reason for admission, current living situation, and potential needs at the time of discharge    Demographics/Insurance verified Yes    Current type of dwelling:Mobile home with no steps to enter     Patient from ECF/SW confirmed with:n/a    Living arrangements:states now lives with friend Clay Vogt ( at bedside) [phone]    Level of function/Support:Independent and friend is supportive     PCP:none - will provide PHS pamphlet     Last Visit to PCP:    DME:none     Active with any community resources/agencies/skilled home care:  HD at [facility] mon/wed/fri 12:15 - 4:15 Verified with Hollie Thorne at clinic. Medication compliance issues:Independent. Pt will need meds to bed on discharge. CM will provide prescription resources. Financial issues that could impact healthcare:fixed income         Tentative discharge plan:home with no anticipated needs   Discussed and provided facilities of choice if transition to a skilled nursing facility is required at the time of discharge n/a not anticipated       Discussed with patient and/or family that on the day of discharge home tentative time of discharge will be between 10 AM and noon.     Transportation at the time of discharge:Shavon Leyva number listed on demographics    Yvan Singh RN, BSN  [phone]

## 2021-10-13 NOTE — FLOWSHEET NOTE
Treatment time: 3 hours  Net UF: 160 ml     Pre weight: 53.2 kg   Post weight: 52.5 kg  EDW: 52.5 kg     Access used: Left fem TDC  Access function: Good with  ml/min     Medications or blood products given: Cathflo to both ports     Regular outpatient schedule: [address]     Summary of response to treatment: Tolerated tx fair. Some hypotension and tachycardia noted mid treatment, which resolved with NSS flush and reducing UF goal. No other HD related complaints or complications.      Copy of dialysis treatment record placed in chart, to be scanned into EMR.        10/13/21 0856 10/13/21 1207   Treatment   Time On  --  0900   Time Off  --  1202   Vital Signs   BP (!) 149/92 129/81   Temp 97.3 °F (36.3 °C) 96.4 °F (35.8 °C)   Pulse 90 116   Resp 18 18   Dialysis Bath   K+ (Potassium) 2  --    Ca+ (Calcium) 2.5  --    Na+ (Sodium) 140  --    HCO3 (Bicarb) 40  --

## 2021-10-13 NOTE — PROGRESS NOTES
[facility] Note    Patient Active Problem List   Diagnosis    PKD (polycystic kidney disease)    H/O: CVA (cerebrovascular accident)    Hypokalemia    [facility] ([facility]) [facility] [address]    Essential hypertension    End stage renal disease ([facility] [address]    Hyperkalemia       Past Medical History:   has a past medical history of Brain aneurysm, Polycystic kidney disease, and Unspecified cerebral artery occlusion with cerebral infarction. Past Social History:   reports that she has been smoking cigarettes. She has been smoking about 0.50 packs per day. She has never used smokeless tobacco. She reports current drug use. Drug: Marijuana. She reports that she does not drink alcohol. Subjective:   Seen on HD. [facility] running well. Review of Systems   Constitutional: Negative for activity change, appetite change, chills, fatigue, fever and unexpected weight change. HENT: Negative for congestion and facial swelling. Eyes: Negative for photophobia, discharge and redness. Respiratory: Negative for cough, chest tightness and shortness of breath. Cardiovascular: Negative for chest pain, palpitations and leg swelling. Gastrointestinal: Negative for abdominal distention, abdominal pain, blood in stool, constipation, diarrhea, nausea and vomiting. Endocrine: Negative for cold intolerance, heat intolerance and polyuria. Genitourinary: Negative for decreased urine volume, difficulty urinating, flank pain and hematuria. Musculoskeletal: Negative for joint swelling and neck pain. Neurological: Negative for dizziness, seizures, syncope, speech difficulty, light-headedness and headaches. Hematological: Does not bruise/bleed easily. Psychiatric/Behavioral: Negative for agitation, confusion and hallucinations.        Objective:      BP (!) 153/105   Pulse 98   Temp 98.5 °F (36.9 °C) (Oral)   Resp 18   Ht 5' 4\" (1.626 m)   Wt 121 lb 4.1 oz (55 kg) SpO2 97%   BMI 20.81 kg/m²     Wt Readings from Last 3 Encounters:   10/12/21 121 lb 4.1 oz (55 kg)   03/16/20 124 lb (56.2 kg)   05/20/19 126 lb 1.6 oz (57.2 kg)       BP Readings from Last 3 Encounters:   10/13/21 (!) 153/105   03/16/20 105/75   05/20/19 138/86     Chest- clear  Heart-regular  Abd-soft  Ext- no edema. Has left femoral TDC    Labs  Hemoglobin   Date Value Ref Range Status   10/13/2021 13.4 12.0 - 16.0 g/dL Final     Hematocrit   Date Value Ref Range Status   10/13/2021 40.4 36.0 - 48.0 % Final     WBC   Date Value Ref Range Status   10/13/2021 8.0 4.0 - 11.0 K/uL Final     Platelets   Date Value Ref Range Status   10/13/2021 235 135 - 450 K/uL Final     Lab Results   Component Value Date    CREATININE 12.4 (HH) 10/13/2021    BUN 78 (H) 10/13/2021     (L) 10/13/2021    K 5.5 (H) 10/13/2021    CL 92 (L) 10/13/2021    CO2 20 (L) 10/13/2021       Assessment/Plan:  1. ESRD. Outpatient hemodialysis Mondays, Wednesdays and Fridays at  [facility]. Very noncompliant with treatments. Hemodialysis today on a shorter time and lower blood flow to avoid  dialysis disequilibrium. Next HD tomorrow. 2.  Hyponatremia 1 liter per 24 hour fluid restriction. 3.  Metabolic acidosis. Positive anion gap. Follow with hemodialysis. 4.  Hypertension. Fluid removal during dialysis. Add Amlodipine if persistent. 5.  Hemoglobin is acceptable. 6.  History of polycystic kidney disease. 7.  Pyuria. Follow urine culture. Since the patient is currently ESRD. 8.  On Ca acetate. Check Phosp and PTH.      Paul Ogden MD

## 2021-10-13 NOTE — DISCHARGE INSTR - COC
Continuity of Care Form    Patient Name: Popeye Jackson   :  1975  MRN:  [id number]    Admit date:  10/12/2021  Discharge date:  ***    Code Status Order: Full Code   Advance Directives:     Admitting Physician:  Luis Garcia MD  PCP: No primary care provider on file. Discharging Nurse: [facility] Unit/Room#: [id number]-[id number]/8489-29  Discharging Unit Phone Number: ***    Emergency Contact:   Extended Emergency Contact Information  Primary Emergency Contact: Aaliyah Brandon Phone: [phone]  Relation: Parent  Secondary Emergency Contact: Anh Nolan  Home Phone: [phone]  Relation: Other    Past Surgical History:  Past Surgical History:   Procedure Laterality Date    EYE SURGERY         Immunization History: There is no immunization history on file for this patient. Active Problems:  Patient Active Problem List   Diagnosis Code    PKD (polycystic kidney disease) Q61.3    H/O: CVA (cerebrovascular accident) Z80.78    Hypokalemia E87.6    Acute renal failure (ARF) ([facility] [address] N17.9    Essential hypertension I10    End stage renal disease ([facility] [address] N18.6    Hyperkalemia E87.5       Isolation/Infection:   Isolation          No Isolation        Unreconciled Outside Infections     Enable clinical decision support by reconciling outside information with the patient's chart.     .    Infection Onset Last Indicated Last Received Source    VRE 10/29/12 10/29/12 10/11/21 [facility]      Patient Infection Status     None to display          Nurse Assessment:  Last Vital Signs: /73   Pulse 132   Temp 97.8 °F (36.6 °C) (Oral)   Resp 18   Ht 5' 4\" (1.626 m)   Wt 115 lb 11.9 oz (52.5 kg)   SpO2 97%   BMI 19.87 kg/m²     Last documented pain score (0-10 scale): Pain Level: 3  Last Weight:   Wt Readings from Last 1 Encounters:   10/13/21 115 lb 11.9 oz (52.5 kg)     Mental Status:  {IP PT MENTAL STATUS:}    IV Access:  [facility] IV ACCESS:815345958}    Nursing Mobility/ADLs:  Walking   {CHP DME BCKM:879258196}  Transfer  {CHP DME VNNT:680191686}  Bathing  {CHP DME LQMU:515046842}  Dressing  {CHP DME GFMH:414863818}  Toileting  {CHP DME WUOY:767846373}  Feeding  {CHP DME LUMF:514915535}  Med Admin  {CHP DME ZXUD:017297931}  Med Delivery   { DORI MED Delivery:482979859}    Wound Care Documentation and Therapy:        Elimination:  Continence:   · Bowel: {YES / KR:09775}  · Bladder: {YES / CW:53205}  Urinary Catheter: {Urinary Catheter:745425014}   Colostomy/Ileostomy/Ileal Conduit: {YES / WV:66830}       Date of Last BM: ***    Intake/Output Summary (Last 24 hours) at 10/13/2021 1550  Last data filed at 10/13/2021 1207  Gross per 24 hour   Intake 1000 ml   Output 1160 ml   Net -160 ml     I/O last 3 completed shifts:   In: 1400   Out: 3088     Safety Concerns:     [address] Safety Concerns:215848803}    Impairments/Disabilities:      [address] Impairments/Disabilities:998929101}    Nutrition Therapy:  Current Nutrition Therapy:   [address] CP[address] Diet List:795472340}    Routes of Feeding: {P DME Other Feedings:244662588}  Liquids: {Slp liquid thickness:44582}  Daily Fluid Restriction: {CHP DME Yes amt example:093759928}  Last Modified Barium Swallow with Video (Video Swallowing Test): {Done Not Done DAET:815823524}    Treatments at the Time of Hospital Discharge:   Respiratory Treatments: ***  Oxygen Therapy:  {Therapy; copd oxygen:87983}  Ventilator:    {[facility] Vent MQLE:153082324}    Rehab Therapies: {THERAPEUTIC INTERVENTION:3316448282}  Weight Bearing Status/Restrictions: [address] [email or web address] Weight Bearin}  Other Medical Equipment (for information only, NOT a DME order):  {EQUIPMENT:160962180}  Other Treatments: ***    Patient's personal belongings (please select all that are sent with patient):  {[facility] DME Belongings:815606223}    RN SIGNATURE:  {Esignature:811389299}    CASE MANAGEMENT/SOCIAL WORK SECTION    Inpatient Status Date: ***    Readmission Risk Assessment Score:  Readmission Risk              Risk of Unplanned Readmission:  13           Discharging to Facility/ Agency   · Name:   · Address:  · Phone:  · Fax:    Dialysis Facility (if applicable)   · [facility]   · Address:[address], [address] [place], [place] [address]  · Dialysis Schedule: Mon/Wed/Fri 12:15-4:15   · Phone:[phone]  · Fax:[phone]    / signature: {Esignature:889262401}    PHYSICIAN SECTION    Prognosis: {Prognosis:9121669095}    Condition at Discharge: [address] [place] [address] Patient Condition:343315859}    Rehab Potential (if transferring to Rehab): {Prognosis:8495046989}    Recommended Labs or Other Treatments After Discharge: ***    Physician Certification: I certify the above information and transfer of Michael Robertson  is necessary for the continuing treatment of the diagnosis listed and that she requires {Admit to Appropriate Level of Care:49865} for {GREATER/LESS:984332463} 30 days.      Update Admission H&P: {CHP DME Changes in CTC:101849525}    PHYSICIAN SIGNATURE:  {Esignature:603082037}

## 2021-10-13 NOTE — PROGRESS NOTES
HOSPITALISTS PROGRESS NOTE    10/13/2021 9:20 AM        Name: Jasmina Martínez . Admitted: 10/12/2021  Primary Care Provider: No primary care provider on file. (Tel: None)      CC:  hyperkalemia    Brief Course: This 55 y.o. female  with PMHx of ESRD status post left femoral tunneled dialysis catheter and hypertension presented with hyperkalemia. Patient reported that she did not had HD since Wednesday and yesterday she received  call from her outpatient laboratory that her potassium levels are elevated to 6.4 and she should go to the emergency department. Patient denied any chest pain, palpitations, shortness of breath, dizziness, numbness, tingling, nausea, vomiting, abdominal pain, diarrhea, any focal sensory or neurological deficits. On admission, patient was hemodynamically stable. Initial lab work showed potassium elevated to 7, creatinine 16.4, BUN: 133, bicarb: 12 sodium: 131, chloride: 91 EKG showed peaked T waves. Patient was given hyperkalemia cocktail in the ED. Nephrology was consulted for emergent HD    Interval history:   Pt seen and examined in hemodialysis today   Overnight events noted and interval ancillary notes  denied any fevers, chills, chest pain, palpitations, cough, SOB, dizziness, blurry vision, bowel or bladder dysfunction, any focal sensory or motor deficits. Assessment & Plan:     Hyperkalemia: Potassium elevated to 7 on admission  EKG with peaked T waves; status post hyperkalemia cocktail in the ED  Nephrology on board;  underwent HD x2  Monitor on telemetry and monitor potassium closely     History of ESRD on hemodialysis Monday Wednesday Friday  Noncompliant with HD; reports that she did not HD since Wednesday  Nephrology on board; bladder dialysis tomorrow     Hyponatremia: Serum sodium 131 on admission; improved  Continue 1 L per 24-hour fluid restriction     Anion gap metabolic acidosis:  In the setting of ESRD  Started on HD; follow-up labs     Hx of hypertension: Continue home dose of metoprolol     Asymptomatic pyuria: Urine suggestive of an infection  Patient completely asymptomatic  Urine culture ordered  will hold off on any antibiotics for now as patient is afebrile and completely symptomatic           DVT prophylaxis: s/c heparin  Code:Full Code  Diet: ADULT DIET; Regular; Low Sodium (2 gm); Low Potassium (Less than 3000 mg/day); Low Phosphorus (Less than 1000 mg)    Disposition: Home tomorrow after dialysis    Current Medications  sodium chloride flush 0.9 % injection 5-40 mL, 2 times per day  sodium chloride flush 0.9 % injection 5-40 mL, PRN  0.9 % sodium chloride infusion, PRN  ondansetron (ZOFRAN-ODT) disintegrating tablet 4 mg, Q8H PRN   Or  ondansetron (ZOFRAN) injection 4 mg, Q6H PRN  acetaminophen (TYLENOL) tablet 650 mg, Q6H PRN   Or  acetaminophen (TYLENOL) suppository 650 mg, Q6H PRN  heparin (porcine) injection 5,000 Units, 3 times per day  Calcium Acetate (Phos Binder) CAPS 667 mg, TID WC  metoprolol tartrate (LOPRESSOR) tablet 25 mg, BID  heparin (porcine) injection 4,600 Units, PRN  cyclobenzaprine (FLEXERIL) tablet 10 mg, TID PRN        Objective:  BP (!) 153/105   Pulse 98   Temp 98.5 °F (36.9 °C) (Oral)   Resp 18   Ht 5' 4\" (1.626 m)   Wt 121 lb 4.1 oz (55 kg)   SpO2 97%   BMI 20.81 kg/m²     Intake/Output Summary (Last 24 hours) at 10/13/2021 0920  Last data filed at 10/12/2021 1548  Gross per 24 hour   Intake 400 ml   Output 1928 ml   Net -1528 ml      Wt Readings from Last 3 Encounters:   10/12/21 121 lb 4.1 oz (55 kg)   03/16/20 124 lb (56.2 kg)   05/20/19 126 lb 1.6 oz (57.2 kg)       Physical Examination:   General appearance:  No apparent distress, appears stated age and cooperative. Eyes: Sclera clear. Pupils equal.  ENT: Moist oral mucosa. Trachea midline, no adenopathy. Cardiovascular: Regular rhythm, normal S1, S2. No murmur.  No edema in lower extremities  Respiratory:Not using accessory muscles. Good inspiratory effort. Clear to auscultation bilaterally, no wheeze or crackles. GI: Abdomen soft, no tenderness, not distended, normal bowel sounds  Musculoskeletal: normal ROM, No cyanosis in digits  Neurology: CN 2-12 grossly intact. No speech or motor deficits  Psych: Normal affect. Alert and oriented in time, place and person  Skin: Warm, dry, normal turgor    Labs and Tests:  CBC:   Recent Labs     10/12/21  0820 10/13/21  0522   WBC 8.6 8.0   HGB 13.5 13.4    235     BMP:    Recent Labs     10/12/21  0820 10/12/21  1405 10/13/21  0522   *  --  134*   K 7.0* 5.1 5.5*   CL 91*  --  92*   CO2 12*  --  20*   *  --  78*   CREATININE 16.4*  --  12.4*   GLUCOSE 76  --  95     Hepatic:   Recent Labs     10/12/21  0820 10/13/21  0522   AST 11* 13*   ALT 9* 8*   BILITOT 0.3 0.4   ALKPHOS 167* 159*     No orders to display       Problem List  Active Problems:    Hyperkalemia  Resolved Problems:    * No resolved hospital problems.  Conchita Phalen, MD   10/13/2021 9:20 AM
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
## 2021-10-14 VITALS
WEIGHT: 115.74 LBS | HEART RATE: 95 BPM | BODY MASS INDEX: 19.76 KG/M2 | TEMPERATURE: 98.3 F | SYSTOLIC BLOOD PRESSURE: 130 MMHG | OXYGEN SATURATION: 96 % | DIASTOLIC BLOOD PRESSURE: 85 MMHG | RESPIRATION RATE: 16 BRPM | HEIGHT: 64 IN

## 2021-10-14 LAB
A/G RATIO: 1.4 (ref 1.1–2.2)
ALBUMIN SERPL-MCNC: 4.1 G/DL (ref 3.4–5)
ALP BLD-CCNC: 126 U/L (ref 40–129)
ALT SERPL-CCNC: 7 U/L (ref 10–40)
ANION GAP SERPL CALCULATED.3IONS-SCNC: 16 MMOL/L (ref 3–16)
AST SERPL-CCNC: 10 U/L (ref 15–37)
BASOPHILS ABSOLUTE: 0.1 K/UL (ref 0–0.2)
BASOPHILS RELATIVE PERCENT: 0.9 %
BILIRUB SERPL-MCNC: 0.3 MG/DL (ref 0–1)
BUN BLDV-MCNC: 42 MG/DL (ref 7–20)
CALCIUM SERPL-MCNC: 9.8 MG/DL (ref 8.3–10.6)
CHLORIDE BLD-SCNC: 95 MMOL/L (ref 99–110)
CO2: 27 MMOL/L (ref 21–32)
CREAT SERPL-MCNC: 7.5 MG/DL (ref 0.6–1.1)
EOSINOPHILS ABSOLUTE: 0 K/UL (ref 0–0.6)
EOSINOPHILS RELATIVE PERCENT: 0.6 %
GFR AFRICAN AMERICAN: 7
GFR NON-AFRICAN AMERICAN: 6
GLOBULIN: 3 G/DL
GLUCOSE BLD-MCNC: 119 MG/DL (ref 70–99)
HCT VFR BLD CALC: 38.1 % (ref 36–48)
HEMOGLOBIN: 12.5 G/DL (ref 12–16)
LYMPHOCYTES ABSOLUTE: 1.1 K/UL (ref 1–5.1)
LYMPHOCYTES RELATIVE PERCENT: 17.2 %
MCH RBC QN AUTO: 28.6 PG (ref 26–34)
MCHC RBC AUTO-ENTMCNC: 32.7 G/DL (ref 31–36)
MCV RBC AUTO: 87.4 FL (ref 80–100)
MONOCYTES ABSOLUTE: 0.6 K/UL (ref 0–1.3)
MONOCYTES RELATIVE PERCENT: 10.2 %
NEUTROPHILS ABSOLUTE: 4.4 K/UL (ref 1.7–7.7)
NEUTROPHILS RELATIVE PERCENT: 71.1 %
PARATHYROID HORMONE INTACT: 1272 PG/ML (ref 14–72)
PDW BLD-RTO: 15.1 % (ref 12.4–15.4)
PHOSPHORUS: 9 MG/DL (ref 2.5–4.9)
PLATELET # BLD: 183 K/UL (ref 135–450)
PMV BLD AUTO: 9.9 FL (ref 5–10.5)
POTASSIUM REFLEX MAGNESIUM: 5 MMOL/L (ref 3.5–5.1)
RBC # BLD: 4.35 M/UL (ref 4–5.2)
SODIUM BLD-SCNC: 138 MMOL/L (ref 136–145)
TOTAL PROTEIN: 7.1 G/DL (ref 6.4–8.2)
WBC # BLD: 6.2 K/UL (ref 4–11)

## 2021-10-14 PROCEDURE — 84100 ASSAY OF PHOSPHORUS: CPT

## 2021-10-14 PROCEDURE — 90935 HEMODIALYSIS ONE EVALUATION: CPT

## 2021-10-14 PROCEDURE — 36415 COLL VENOUS BLD VENIPUNCTURE: CPT

## 2021-10-14 PROCEDURE — 83970 ASSAY OF PARATHORMONE: CPT

## 2021-10-14 PROCEDURE — 6370000000 HC RX 637 (ALT 250 FOR IP): Performed by: INTERNAL MEDICINE

## 2021-10-14 PROCEDURE — 6360000002 HC RX W HCPCS: Performed by: INTERNAL MEDICINE

## 2021-10-14 PROCEDURE — 80053 COMPREHEN METABOLIC PANEL: CPT

## 2021-10-14 PROCEDURE — 2580000003 HC RX 258: Performed by: INTERNAL MEDICINE

## 2021-10-14 PROCEDURE — 2500000003 HC RX 250 WO HCPCS: Performed by: INTERNAL MEDICINE

## 2021-10-14 PROCEDURE — 85025 COMPLETE CBC W/AUTO DIFF WBC: CPT

## 2021-10-14 RX ORDER — CYCLOBENZAPRINE HCL 10 MG
10 TABLET ORAL ONCE
Status: COMPLETED | OUTPATIENT
Start: 2021-10-14 | End: 2021-10-14

## 2021-10-14 RX ORDER — SEVELAMER CARBONATE 800 MG/1
1600 TABLET, FILM COATED ORAL
Qty: 90 TABLET | Refills: 1 | Status: SHIPPED | OUTPATIENT
Start: 2021-10-14

## 2021-10-14 RX ORDER — SEVELAMER CARBONATE 800 MG/1
1600 TABLET, FILM COATED ORAL
Status: DISCONTINUED | OUTPATIENT
Start: 2021-10-14 | End: 2021-10-14 | Stop reason: HOSPADM

## 2021-10-14 RX ADMIN — HEPARIN SODIUM 5000 UNITS: 5000 INJECTION INTRAVENOUS; SUBCUTANEOUS at 05:35

## 2021-10-14 RX ADMIN — Medication 10 ML: at 08:21

## 2021-10-14 RX ADMIN — METOPROLOL TARTRATE 25 MG: 25 TABLET, FILM COATED ORAL at 08:21

## 2021-10-14 RX ADMIN — HEPARIN SODIUM 5000 UNITS: 5000 INJECTION INTRAVENOUS; SUBCUTANEOUS at 13:46

## 2021-10-14 RX ADMIN — CALCIUM ACETATE 667 MG: 667 CAPSULE ORAL at 08:21

## 2021-10-14 RX ADMIN — CYCLOBENZAPRINE 10 MG: 10 TABLET, FILM COATED ORAL at 13:46

## 2021-10-14 ASSESSMENT — ENCOUNTER SYMPTOMS
BLOOD IN STOOL: 0
ABDOMINAL DISTENTION: 0
CHEST TIGHTNESS: 0
SHORTNESS OF BREATH: 0
ABDOMINAL PAIN: 0
FACIAL SWELLING: 0
VOMITING: 0
EYE REDNESS: 0
NAUSEA: 0
EYE DISCHARGE: 0
COUGH: 0
PHOTOPHOBIA: 0
DIARRHEA: 0
CONSTIPATION: 0

## 2021-10-14 ASSESSMENT — PAIN DESCRIPTION - ORIENTATION
ORIENTATION: LEFT
ORIENTATION: LEFT

## 2021-10-14 ASSESSMENT — PAIN DESCRIPTION - LOCATION
LOCATION: LEG
LOCATION: LEG

## 2021-10-14 ASSESSMENT — PAIN DESCRIPTION - FREQUENCY
FREQUENCY: INTERMITTENT
FREQUENCY: INTERMITTENT

## 2021-10-14 ASSESSMENT — PAIN SCALES - GENERAL
PAINLEVEL_OUTOF10: 0
PAINLEVEL_OUTOF10: 5
PAINLEVEL_OUTOF10: 0
PAINLEVEL_OUTOF10: 5

## 2021-10-14 ASSESSMENT — PAIN DESCRIPTION - PAIN TYPE
TYPE: CHRONIC PAIN
TYPE: CHRONIC PAIN

## 2021-10-14 ASSESSMENT — PAIN SCALES - WONG BAKER: WONGBAKER_NUMERICALRESPONSE: 0

## 2021-10-14 ASSESSMENT — PAIN DESCRIPTION - DESCRIPTORS
DESCRIPTORS: ACHING
DESCRIPTORS: ACHING

## 2021-10-14 NOTE — PROGRESS NOTES
Data- discharge order received, pt verbalized agreement to discharge, disposition to previous residence, no needs for HHC/DME. Action- discharge instructions prepared and given to pt, pt verbalized understanding. Medication information packet given r/t NEW and/or CHANGED prescriptions emphasizing name/purpose/side effects, pt verbalized understanding. Discharge instruction summary: Diet- renal, Activity- independent, Primary Care Physician as follows: No primary care provider on file. None f/u appointment reviewed and complete, immunizations reviewed and complete, prescription medications filled . Inpatient surgical procedure precautions reviewed: 1. WEIGHT: Admit Weight:  (on stretcher without scale, too obtuned to stand to weigh.) (10/12/21 1300)        Today  Weight: 115 lb 11.9 oz (52.5 kg) (10/13/21 1207)       2. O2 SAT.: SpO2: 96 % (10/14/21 0818)    Response- Pt belongings gathered, IV removed. Disposition is home (no HHC/DME needs), transported with RN, taken to lobby via w/c w/ Wheelchair, no complications.

## 2021-10-14 NOTE — DISCHARGE SUMMARY
Hospital Medicine Discharge Summary    Patient ID: Popeye Jackson      Patient's PCP: No primary care provider on file. Admit Date: 10/12/2021     Discharge Date:  10/14/2021    Admitting Physician: Luis Garcia MD     Discharge Physician: Rufino Vang MD        Active Hospital Problems    Diagnosis     Hyperkalemia [facility] Course: This 55 y.o. female  with PMHx of ESRD status post left femoral tunneled dialysis catheter and hypertension presented with hyperkalemia. Patient reported that she did not had HD since Wednesday and yesterday she received  call from her outpatient laboratory that her potassium levels are elevated to 6.4 and she should go to the emergency department. On admission, patient was hemodynamically stable.  Initial lab work showed potassium elevated to 7, creatinine 16.4, BUN: 133, bicarb: 12 sodium: 131, chloride: 91 EKG showed peaked T waves. Patient was given hyperkalemia cocktail in the ED.  Nephrology was consulted for emergent HD       Hyperkalemia: Resolved  Potassium elevated to 7 and EKG with peaked T waves on admission. Received post hyperkalemia cocktail in the ED  Nephrology was consulted and patient underwent emergent hemodialysis.     History of ESRD on hemodialysis Monday Wednesday Friday; noncompliant with hemodialysis and medication  Nephrology was contacted and the patient is in dialysis x2 during hospital stay     Hyponatremia: Serum sodium 131 on admission; improved  Continue 1 L per 24-hour fluid restriction     Anion gap metabolic acidosis: In the setting of ESRD; improved with HD     Hx of hypertension: Continue home dose of metoprolol     Asymptomatic pyuria: Urine suggestive of an infection but urine culture was negative and patient was completely asymptomatic    Secondary hyperparathyroidism: Noncompliant with medications.  Sevelamer was filled and delivered to the patient by pharmacy but patient refused to take it     Physical Exam 2.5 MG TABS tablet Take 2.5 mg by mouth 2 times daily      metoprolol tartrate (LOPRESSOR) 25 MG tablet Take 25 mg by mouth 2 times daily       oxyCODONE (ROXICODONE) 5 MG immediate release tablet Take 5 mg by mouth 2 times daily. The patient was seen and examined on day of discharge and this discharge summary is in conjunction with any daily progress note from day of discharge. Time Spent on discharge is 35 minutes  in the examination, evaluation, counseling and review of medications and discharge plan. Note that greater than 25 minutes was spent in preparing discharge papers, discussing discharge with patient, medication review, etc.     Signed:    Isabel Khan MD   10/14/2021      Thank you No primary care provider on file. for the opportunity to be involved in this patient's care. If you have any questions or concerns please feel free to contact me at [phone].

## 2021-10-14 NOTE — PROGRESS NOTES
Pt refused to take her d/c medications, RENVELA. She stated \" medication will make her sick\" and she does not want to take it. Physician made aware through phone call.

## 2021-10-14 NOTE — PROGRESS NOTES
[facility] Note    Patient Active Problem List   Diagnosis    PKD (polycystic kidney disease)    H/O: CVA (cerebrovascular accident)    Hypokalemia    [facility] failure ([facility]F) [facility]    Essential hypertension    End stage renal disease ([facility]    Hyperkalemia       Past Medical History:   has a past medical history of Brain aneurysm, Polycystic kidney disease, and Unspecified cerebral artery occlusion with cerebral infarction. Past Social History:   reports that she has been smoking cigarettes. She has been smoking about 0.50 packs per day. She has never used smokeless tobacco. She reports current drug use. Drug: Marijuana. She reports that she does not drink alcohol. Subjective:   Had HD yesterday. For HD today. No SOB. Review of Systems   Constitutional: Negative for activity change, appetite change, chills, fatigue, fever and unexpected weight change. HENT: Negative for congestion and facial swelling. Eyes: Negative for photophobia, discharge and redness. Respiratory: Negative for cough, chest tightness and shortness of breath. Cardiovascular: Negative for chest pain, palpitations and leg swelling. Gastrointestinal: Negative for abdominal distention, abdominal pain, blood in stool, constipation, diarrhea, nausea and vomiting. Endocrine: Negative for cold intolerance, heat intolerance and polyuria. Genitourinary: Negative for decreased urine volume, difficulty urinating, flank pain and hematuria. Musculoskeletal: Negative for joint swelling and neck pain. Neurological: Negative for dizziness, seizures, syncope, speech difficulty, light-headedness and headaches. Hematological: Does not bruise/bleed easily. Psychiatric/Behavioral: Negative for agitation, confusion and hallucinations.        Objective:      /85   Pulse 95   Temp 98.3 °F (36.8 °C) (Oral)   Resp 16   Ht 5' 4\" (1.626 m)   Wt 115 lb 11.9 oz